# Patient Record
Sex: MALE | Race: WHITE | NOT HISPANIC OR LATINO | Employment: UNEMPLOYED | URBAN - METROPOLITAN AREA
[De-identification: names, ages, dates, MRNs, and addresses within clinical notes are randomized per-mention and may not be internally consistent; named-entity substitution may affect disease eponyms.]

---

## 2018-06-12 ENCOUNTER — OFFICE VISIT (OUTPATIENT)
Dept: RETAIL CLINIC | Facility: CLINIC | Age: 38
End: 2018-06-12

## 2018-06-12 DIAGNOSIS — Z02.1 DRUG SCREENING, PRE-EMPLOYMENT: Primary | ICD-10-CM

## 2021-03-16 ENCOUNTER — APPOINTMENT (OUTPATIENT)
Dept: GENERAL RADIOLOGY | Facility: HOSPITAL | Age: 41
End: 2021-03-16

## 2021-03-16 ENCOUNTER — APPOINTMENT (OUTPATIENT)
Dept: CT IMAGING | Facility: HOSPITAL | Age: 41
End: 2021-03-16

## 2021-03-16 ENCOUNTER — HOSPITAL ENCOUNTER (EMERGENCY)
Facility: HOSPITAL | Age: 41
Discharge: HOME | End: 2021-03-16
Attending: EMERGENCY MEDICINE | Admitting: EMERGENCY MEDICINE

## 2021-03-16 VITALS
HEART RATE: 87 BPM | OXYGEN SATURATION: 100 % | RESPIRATION RATE: 16 BRPM | HEIGHT: 68 IN | SYSTOLIC BLOOD PRESSURE: 117 MMHG | TEMPERATURE: 98.3 F | WEIGHT: 230 LBS | DIASTOLIC BLOOD PRESSURE: 79 MMHG | BODY MASS INDEX: 34.86 KG/M2

## 2021-03-16 DIAGNOSIS — F15.10 METHAMPHETAMINE USE (HCC): ICD-10-CM

## 2021-03-16 DIAGNOSIS — R07.89 OTHER CHEST PAIN: ICD-10-CM

## 2021-03-16 DIAGNOSIS — R59.9 LYMPH NODE ENLARGEMENT: ICD-10-CM

## 2021-03-16 DIAGNOSIS — K20.90 ESOPHAGITIS: Primary | ICD-10-CM

## 2021-03-16 LAB
ALBUMIN SERPL-MCNC: 4.4 G/DL (ref 3.5–5.2)
ALBUMIN/GLOB SERPL: 1.5 G/DL
ALP SERPL-CCNC: 104 U/L (ref 39–117)
ALT SERPL W P-5'-P-CCNC: 18 U/L (ref 1–41)
AMPHET+METHAMPHET UR QL: POSITIVE
AMPHETAMINES UR QL: POSITIVE
ANION GAP SERPL CALCULATED.3IONS-SCNC: 13.7 MMOL/L (ref 5–15)
AST SERPL-CCNC: 11 U/L (ref 1–40)
BARBITURATES UR QL SCN: NEGATIVE
BASOPHILS # BLD AUTO: 0.07 10*3/MM3 (ref 0–0.2)
BASOPHILS NFR BLD AUTO: 0.4 % (ref 0–1.5)
BENZODIAZ UR QL SCN: NEGATIVE
BILIRUB SERPL-MCNC: 0.6 MG/DL (ref 0–1.2)
BUN SERPL-MCNC: 20 MG/DL (ref 6–20)
BUN/CREAT SERPL: 16.1 (ref 7–25)
BUPRENORPHINE SERPL-MCNC: NEGATIVE NG/ML
CALCIUM SPEC-SCNC: 9.4 MG/DL (ref 8.6–10.5)
CANNABINOIDS SERPL QL: NEGATIVE
CHLORIDE SERPL-SCNC: 100 MMOL/L (ref 98–107)
CO2 SERPL-SCNC: 23.3 MMOL/L (ref 22–29)
COCAINE UR QL: NEGATIVE
CREAT SERPL-MCNC: 1.24 MG/DL (ref 0.76–1.27)
D DIMER PPP FEU-MCNC: 0.5 MCGFEU/ML (ref 0–0.46)
DEPRECATED RDW RBC AUTO: 44.9 FL (ref 37–54)
EOSINOPHIL # BLD AUTO: 0.3 10*3/MM3 (ref 0–0.4)
EOSINOPHIL NFR BLD AUTO: 1.7 % (ref 0.3–6.2)
ERYTHROCYTE [DISTWIDTH] IN BLOOD BY AUTOMATED COUNT: 13.4 % (ref 12.3–15.4)
GFR SERPL CREATININE-BSD FRML MDRD: 64 ML/MIN/1.73
GLOBULIN UR ELPH-MCNC: 2.9 GM/DL
GLUCOSE SERPL-MCNC: 214 MG/DL (ref 65–99)
HCT VFR BLD AUTO: 47.7 % (ref 37.5–51)
HGB BLD-MCNC: 15.7 G/DL (ref 13–17.7)
IMM GRANULOCYTES # BLD AUTO: 0.04 10*3/MM3 (ref 0–0.05)
IMM GRANULOCYTES NFR BLD AUTO: 0.2 % (ref 0–0.5)
LYMPHOCYTES # BLD AUTO: 2.17 10*3/MM3 (ref 0.7–3.1)
LYMPHOCYTES NFR BLD AUTO: 12 % (ref 19.6–45.3)
MCH RBC QN AUTO: 29.6 PG (ref 26.6–33)
MCHC RBC AUTO-ENTMCNC: 32.9 G/DL (ref 31.5–35.7)
MCV RBC AUTO: 89.8 FL (ref 79–97)
METHADONE UR QL SCN: NEGATIVE
MONOCYTES # BLD AUTO: 1.07 10*3/MM3 (ref 0.1–0.9)
MONOCYTES NFR BLD AUTO: 5.9 % (ref 5–12)
NEUTROPHILS NFR BLD AUTO: 14.46 10*3/MM3 (ref 1.7–7)
NEUTROPHILS NFR BLD AUTO: 79.8 % (ref 42.7–76)
OPIATES UR QL: NEGATIVE
OXYCODONE UR QL SCN: NEGATIVE
PCP UR QL SCN: NEGATIVE
PLATELET # BLD AUTO: 326 10*3/MM3 (ref 140–450)
PMV BLD AUTO: 10.3 FL (ref 6–12)
POTASSIUM SERPL-SCNC: 3.9 MMOL/L (ref 3.5–5.2)
PROPOXYPH UR QL: NEGATIVE
PROT SERPL-MCNC: 7.3 G/DL (ref 6–8.5)
RBC # BLD AUTO: 5.31 10*6/MM3 (ref 4.14–5.8)
SODIUM SERPL-SCNC: 137 MMOL/L (ref 136–145)
TRICYCLICS UR QL SCN: NEGATIVE
TROPONIN T SERPL-MCNC: <0.01 NG/ML (ref 0–0.03)
TROPONIN T SERPL-MCNC: <0.01 NG/ML (ref 0–0.03)
WBC # BLD AUTO: 18.11 10*3/MM3 (ref 3.4–10.8)

## 2021-03-16 PROCEDURE — 93010 ELECTROCARDIOGRAM REPORT: CPT | Performed by: INTERNAL MEDICINE

## 2021-03-16 PROCEDURE — 99284 EMERGENCY DEPT VISIT MOD MDM: CPT

## 2021-03-16 PROCEDURE — 99284 EMERGENCY DEPT VISIT MOD MDM: CPT | Performed by: EMERGENCY MEDICINE

## 2021-03-16 PROCEDURE — 71275 CT ANGIOGRAPHY CHEST: CPT

## 2021-03-16 PROCEDURE — 25010000002 KETOROLAC TROMETHAMINE PER 15 MG: Performed by: EMERGENCY MEDICINE

## 2021-03-16 PROCEDURE — 71045 X-RAY EXAM CHEST 1 VIEW: CPT

## 2021-03-16 PROCEDURE — 84484 ASSAY OF TROPONIN QUANT: CPT | Performed by: EMERGENCY MEDICINE

## 2021-03-16 PROCEDURE — 93005 ELECTROCARDIOGRAM TRACING: CPT | Performed by: EMERGENCY MEDICINE

## 2021-03-16 PROCEDURE — 96374 THER/PROPH/DIAG INJ IV PUSH: CPT

## 2021-03-16 PROCEDURE — 80306 DRUG TEST PRSMV INSTRMNT: CPT | Performed by: EMERGENCY MEDICINE

## 2021-03-16 PROCEDURE — 93005 ELECTROCARDIOGRAM TRACING: CPT

## 2021-03-16 PROCEDURE — 0 IOPAMIDOL PER 1 ML: Performed by: EMERGENCY MEDICINE

## 2021-03-16 PROCEDURE — 80053 COMPREHEN METABOLIC PANEL: CPT | Performed by: EMERGENCY MEDICINE

## 2021-03-16 PROCEDURE — 85025 COMPLETE CBC W/AUTO DIFF WBC: CPT | Performed by: EMERGENCY MEDICINE

## 2021-03-16 PROCEDURE — 85379 FIBRIN DEGRADATION QUANT: CPT | Performed by: EMERGENCY MEDICINE

## 2021-03-16 RX ORDER — KETOROLAC TROMETHAMINE 30 MG/ML
30 INJECTION, SOLUTION INTRAMUSCULAR; INTRAVENOUS EVERY 6 HOURS PRN
Status: DISCONTINUED | OUTPATIENT
Start: 2021-03-16 | End: 2021-03-16

## 2021-03-16 RX ORDER — FAMOTIDINE 20 MG/1
20 TABLET, FILM COATED ORAL 2 TIMES DAILY
Qty: 30 TABLET | Refills: 0 | Status: SHIPPED | OUTPATIENT
Start: 2021-03-16 | End: 2021-07-27 | Stop reason: ALTCHOICE

## 2021-03-16 RX ORDER — ASPIRIN 81 MG/1
324 TABLET, CHEWABLE ORAL ONCE
Status: COMPLETED | OUTPATIENT
Start: 2021-03-16 | End: 2021-03-16

## 2021-03-16 RX ORDER — SUCRALFATE ORAL 1 G/10ML
1 SUSPENSION ORAL 4 TIMES DAILY
Qty: 420 ML | Refills: 0 | Status: SHIPPED | OUTPATIENT
Start: 2021-03-16 | End: 2021-04-20

## 2021-03-16 RX ORDER — KETOROLAC TROMETHAMINE 30 MG/ML
30 INJECTION, SOLUTION INTRAMUSCULAR; INTRAVENOUS ONCE
Status: COMPLETED | OUTPATIENT
Start: 2021-03-16 | End: 2021-03-16

## 2021-03-16 RX ADMIN — IOPAMIDOL 127 ML: 755 INJECTION, SOLUTION INTRAVENOUS at 12:10

## 2021-03-16 RX ADMIN — ASPIRIN 81 MG CHEWABLE TABLET 324 MG: 81 TABLET CHEWABLE at 11:01

## 2021-03-16 RX ADMIN — KETOROLAC TROMETHAMINE 30 MG: 30 INJECTION, SOLUTION INTRAMUSCULAR; INTRAVENOUS at 11:01

## 2021-03-16 NOTE — ED PROVIDER NOTES
Subjective   History of Present Illness  History of Present Illness    Chief complaint: Chest pain    Location: Right upper portion of the chest, initially nipple to nipple, now localized in the right upper chest    Quality/Severity: 10 being the worst, 5 currently, sharp    Timing/Onset/Duration: Acute onset at 2 AM, intermittent, lasting about 30 minutes, resolving for about 45 minutes and then returning    Modifying Factors: No improvement with Aleve or ibuprofen.  The patient took 2 Aleve at 4:56 AM and about an hour prior to arrival approximately 9:30 AM took 2 Motrin.    Associated Symptoms: Patient had nausea but no vomiting he is short of breath.  He got diaphoretic with the pain.  He denies any fever chills or cough.  No abdominal pain.  The patient states that he had heartburn for about 2 hours last night.    Narrative: This 40-year-old white male presents with right upper chest pain.  The patient has no history of coronary artery disease.  He chews tobacco but does not smoke.  His mother had a balloon angioplasty.  The patient is not diabetic.  He has a history of hypertension but takes no medications.  He is unsure about his cholesterol or lipid status.  The patient has never had a blood clot in his leg or lung.  No recent long trips or surgeries.  No bleeding or clotting problems.  No cancer.  The patient is not on hormone therapy.    PCP: None      Review of Systems   Constitutional: Negative for chills and fever.   HENT: Negative for ear pain and sore throat.    Eyes: Negative for discharge and redness.   Respiratory: Negative for cough, chest tightness and shortness of breath.    Cardiovascular: Positive for chest pain. Negative for palpitations and leg swelling.   Gastrointestinal: Negative for abdominal pain, blood in stool, diarrhea, nausea and vomiting.   Genitourinary: Negative for difficulty urinating and dysuria.   Musculoskeletal: Negative for back pain, neck pain and neck stiffness.   Skin:  Negative for rash.   Neurological: Negative for dizziness, light-headedness and headaches.   Psychiatric/Behavioral: Negative.  Negative for confusion.        Medication List      You have not been prescribed any medications.         No past medical history on file.    No Known Allergies    Past Surgical History:   Procedure Laterality Date   • APPENDECTOMY         No family history on file.    Social History     Socioeconomic History   • Marital status: Single     Spouse name: Not on file   • Number of children: Not on file   • Years of education: Not on file   • Highest education level: Not on file   Tobacco Use   • Smoking status: Never Smoker   • Smokeless tobacco: Current User     Types: Chew   Vaping Use   • Vaping Use: Never used   Substance and Sexual Activity   • Alcohol use: Defer   • Drug use: Defer   • Sexual activity: Defer           Objective   Physical Exam  Vitals and nursing note reviewed.   Constitutional:       General: He is not in acute distress.     Appearance: He is well-developed. He is not diaphoretic.      Comments: ED Triage Vitals (03/16/21 0960)  Temp: 98.3 °F (36.8 °C)  Heart Rate: (!) 122  Resp: 20  BP: (!) 187/132  SpO2: 99 %  Temp src: Oral  Heart Rate Source: Monitor  Patient Position: Lying  BP Location: Right arm  FiO2 (%): n/a    The patient's vitals were reviewed by me.  Unless otherwise noted they are within normal limits.     HENT:      Head: Normocephalic and atraumatic.      Right Ear: External ear normal.      Left Ear: External ear normal.      Nose: Nose normal.   Eyes:      General:         Right eye: No discharge.         Left eye: No discharge.      Conjunctiva/sclera: Conjunctivae normal.      Pupils: Pupils are equal, round, and reactive to light.   Neck:      Thyroid: No thyromegaly.      Vascular: No JVD.      Trachea: No tracheal deviation.   Cardiovascular:      Rate and Rhythm: Normal rate and regular rhythm.      Heart sounds: Normal heart sounds. No murmur. No  friction rub. No gallop.    Pulmonary:      Effort: Pulmonary effort is normal. No respiratory distress.      Breath sounds: Normal breath sounds. No stridor. No wheezing or rales.   Chest:      Chest wall: No tenderness.   Abdominal:      General: Bowel sounds are normal. There is no distension.      Palpations: Abdomen is soft. There is no mass.      Tenderness: There is no abdominal tenderness. There is no guarding or rebound.      Hernia: No hernia is present.   Musculoskeletal:         General: No deformity. Normal range of motion.      Cervical back: Normal range of motion and neck supple.      Right lower leg: No tenderness. No edema.      Left lower leg: No tenderness. No edema.   Lymphadenopathy:      Cervical: No cervical adenopathy.   Skin:     General: Skin is warm and dry.      Coloration: Skin is not pale.      Findings: No erythema or rash.   Neurological:      General: No focal deficit present.      Mental Status: He is alert and oriented to person, place, and time.   Psychiatric:         Behavior: Behavior normal.         Procedures           ED Course  ED Course as of Mar 16 1313   Tue Mar 16, 2021   1131 The laboratory values reviewed by me.  The D-dimer is mildly elevated 0.5.  The white blood cell count is 18,000.  Relative neutrophil percent is 79%.  The serum glucose is 214 mg/dL.    [RC]   1306 The urine drug screen is positive for methamphetamine and amphetamine.    [RC]      ED Course User Index  [RC] Marquis Nicole MD      .10:06 EDT, 03/16/21:  The EKG shows a sinus tachycardia with a rate of 108.  There is a normal axis with no hypertrophy.  The TN, QRS, QT intervals are unremarkable.  There is no ectopy.  There is no acute ST elevation or depression.    11:32 EDT, 03/16/21:  The patient is feeling better.  His blood pressure is 115/81, his heart rate is 81, respirations 16, room air sats of 99%.  His chest pain is better.  With elevated D-dimer and shortness of breath I will obtain  a CT angiogram of the chest to evaluate for pulmonary embolus, I will get a urine drug screen.    12:53 EDT, 03/16/21:  The patient states that his chest pain has resolved with the Toradol.  He has no chest pain or shortness of breath.  His vital signs were reviewed and are stable.  We are awaiting his urine drug screen report.  We are awaiting his CT angiogram of the chest report.    12:54 EDT, 03/16/21:  Repeat EKG shows a normal sinus rhythm with rate of 76.  There is a normal axis with no hypertrophy.  The HI, QRS, QT intervals are unremarkable.  There is no ectopy.  There is no acute ST elevation or depression.  This EKG was compared to an EKG dated 3/16/2021 at 947 and is essentially unchanged.    13:03 EDT, 03/16/21:  The CT angiogram of the chest shows no pulmonary embolus.  There is a prominent lymph node in the region of the gastrohepatic ligament that will need further follow-up.  There is evidence of esophagitis.  The plan will be to have this patient follow-up with a gastroenterologist for further work-up and evaluation of the esophagitis and the lymph node in this region.    13:14 EDT, 03/16/21:  The patient's diagnosis of esophagitis, enlarged abdominal lymph node, and methamphetamine use with chest pain was discussed with the patient he did state that he does use methamphetamine.  He has been advised to stop using methamphetamine.  I will place him on Pepcid.  I will give him a prescription for Carafate.  The patient should follow-up with Dr. Qiu within 1 week.  The importance of following up with Dr. Qiu was discussed with the patient as he likely may need an EGD and evaluation of the swollen lymph node to further evaluate the patient and rule out malignancy.  All the patient's questions were answered he will be discharged in good condition.  The patient should avoid spicy and fatty foods.  He should avoid alcohol.  The patient should return if there is worsening pain, shortness of  breath, vomiting blood, black, bloody, or tarry stools, worse in any way at all.                                         MDM    Final diagnoses:   Esophagitis   Lymph node enlargement   Methamphetamine use (CMS/HCC)   Other chest pain       ED Disposition  ED Disposition     None          No follow-up provider specified.       Medication List      No changes were made to your prescriptions during this visit.          Marquis Nicole MD  03/16/21 6220

## 2021-03-16 NOTE — DISCHARGE INSTRUCTIONS
Avoid spicy and fatty foods.  Avoid alcohol use.  Stop using methamphetamine.  Follow-up with Dr. Qiu within 1 week.  Return to the emergency department if there is worsening pain, black, bloody, or tarry stools, vomiting blood, or worse in any way at all.

## 2021-03-17 LAB
QT INTERVAL: 325 MS
QT INTERVAL: 352 MS

## 2021-03-23 ENCOUNTER — OFFICE VISIT (OUTPATIENT)
Dept: ORTHOPEDIC SURGERY | Facility: CLINIC | Age: 41
End: 2021-03-23

## 2021-03-23 VITALS
WEIGHT: 230 LBS | BODY MASS INDEX: 34.86 KG/M2 | HEIGHT: 68 IN | SYSTOLIC BLOOD PRESSURE: 113 MMHG | DIASTOLIC BLOOD PRESSURE: 72 MMHG | HEART RATE: 93 BPM

## 2021-03-23 DIAGNOSIS — S59.901A ELBOW INJURY, RIGHT, INITIAL ENCOUNTER: Primary | ICD-10-CM

## 2021-03-23 PROCEDURE — 73080 X-RAY EXAM OF ELBOW: CPT | Performed by: NURSE PRACTITIONER

## 2021-03-23 PROCEDURE — 99213 OFFICE O/P EST LOW 20 MIN: CPT | Performed by: NURSE PRACTITIONER

## 2021-03-23 RX ORDER — LORATADINE 10 MG/1
10 TABLET ORAL
COMMUNITY
Start: 2021-02-08

## 2021-03-23 RX ORDER — MELOXICAM 15 MG/1
15 TABLET ORAL DAILY
Qty: 30 TABLET | Refills: 0 | Status: SHIPPED | OUTPATIENT
Start: 2021-03-23 | End: 2021-04-20

## 2021-03-23 NOTE — PROGRESS NOTES
Subjective:     Patient ID: MANUELITO Whitehead is a 40 y.o. male.    Chief Complaint:  Right elbow injury   History of Present Illness  MANUELITO Whitehead 40 y.o male who presents with a 2-3 week history of pain right elbow. He was lifting a heavy item onto a vehicle at work on 3/4/2021 or 3/5/2021 when he heard and felt a pop at the cubital fossa right elbow followed by onset of pain. Presented to urgent care, provided with sling and encouraged to follow-up in our office. Pain present at elbow, cubital fossa with pain radiating to biceps muscle. He is right-hand dominate, experiencing decreased strength forearm and hand. Denies significant swelling, bruising but increased pain and increased weakness with lifting, pulling, pushing. Pain relieved with flexion and holding arm close to body, increased pain with extension, supination and pronation activities. Rates discomfort 3-4 out of 10 describes pain at throbbing, stabbing, shooting in nature. Denies presence of numbness/tingling right upper extremity. Denies previous x-ray, MRI, CT right upper extremity. Denies all other concerns present at this time.      Social History     Occupational History   • Not on file   Tobacco Use   • Smoking status: Never Smoker   • Smokeless tobacco: Current User     Types: Chew   Vaping Use   • Vaping Use: Never used   Substance and Sexual Activity   • Alcohol use: Defer   • Drug use: Defer   • Sexual activity: Defer      History reviewed. No pertinent past medical history.  Past Surgical History:   Procedure Laterality Date   • APPENDECTOMY         History reviewed. No pertinent family history.      Review of Systems   Constitutional: Negative for appetite change, chills, diaphoresis, fever and unexpected weight change.   HENT: Negative for hearing loss, nosebleeds, sore throat and tinnitus.    Eyes: Negative for pain and visual disturbance.   Respiratory: Negative for cough, shortness of breath and wheezing.    Cardiovascular: Negative for  "chest pain and palpitations.   Gastrointestinal: Negative for abdominal pain, diarrhea, nausea and vomiting.   Endocrine: Negative for cold intolerance, heat intolerance and polydipsia.   Genitourinary: Negative for difficulty urinating, dysuria and hematuria.   Musculoskeletal: Positive for arthralgias and myalgias. Negative for joint swelling.   Skin: Negative for rash and wound.   Allergic/Immunologic: Negative for environmental allergies.   Neurological: Negative for dizziness, syncope and numbness.   Hematological: Does not bruise/bleed easily.   Psychiatric/Behavioral: Negative for dysphoric mood and sleep disturbance. The patient is not nervous/anxious.            Objective:  Physical Exam    Vital signs reviewed.   General: No acute distress.  Eyes: conjunctiva clear; pupils equally round and reactive  ENT: external ears and nose atraumatic; oropharynx clear  CV: no peripheral edema  Resp: normal respiratory effort  Skin: no rashes or wounds; normal turgor  Psych: mood and affect appropriate; recent and remote memory intact    Vitals:    03/23/21 1447   BP: 113/72   BP Location: Left arm   Pulse: 93   Weight: 104 kg (230 lb)   Height: 172.7 cm (68\")         03/23/21  1447   Weight: 104 kg (230 lb)     Body mass index is 34.97 kg/m².      Right Elbow Exam     Range of Motion   Extension: 0   Flexion: 120   Pronation: 0   Supination: 120     Muscle Strength   Pronation:  4/5   Supination:  4/5     Tests   Varus: negative  Valgus: negative  Tinel's sign (cubital tunnel): negative    Other   Erythema: absent  Sensation: normal  Pulse: present    Comments:  Positive hook test                 Imaging:  Right Elbow X-Ray  Indication: Pain  Views: AP and Lateral views    Findings:  No fracture  No bony lesion  Normal soft tissues  Normal joint spaces    No prior studies were available for comparison.    Assessment:        1. Elbow injury, right, initial encounter           Plan:  1. Discussed plan of care with " patient. Will proceed with MRI right elbow to evaluate distal biceps tendon tear. Will start meloxicam, plan to see him back in clinic after completion of testing to discuss results and further plan of care. Discussed no lifting, pulling, pushing greater than 5 lbs.   Orders:  Orders Placed This Encounter   Procedures   • XR Elbow 3+ View Right   • MRI elbow right wo contrast       Medications:  New Medications Ordered This Visit   Medications   • meloxicam (MOBIC) 15 MG tablet     Sig: Take 1 tablet by mouth Daily.     Dispense:  30 tablet     Refill:  0       Followup:  No follow-ups on file.    Diagnoses and all orders for this visit:    1. Elbow injury, right, initial encounter (Primary)  -     XR Elbow 3+ View Right  -     MRI elbow right wo contrast; Future    Other orders  -     meloxicam (MOBIC) 15 MG tablet; Take 1 tablet by mouth Daily.  Dispense: 30 tablet; Refill: 0          I ordered and reviewed the VALERY today.     Dictated utilizing Dragon dictation

## 2021-04-02 ENCOUNTER — HOSPITAL ENCOUNTER (OUTPATIENT)
Dept: MRI IMAGING | Facility: HOSPITAL | Age: 41
Discharge: HOME | End: 2021-04-02
Admitting: NURSE PRACTITIONER

## 2021-04-02 DIAGNOSIS — S59.901A ELBOW INJURY, RIGHT, INITIAL ENCOUNTER: ICD-10-CM

## 2021-04-02 PROCEDURE — 73221 MRI JOINT UPR EXTREM W/O DYE: CPT

## 2021-04-05 NOTE — PROGRESS NOTES
Junie,   When you have a chance, can you call Mr. Whitehead and let him know his MRI results show tendinitis but no tear requiring surgery. He will benefit from occupational therapy if he would like to proceed. Thanks

## 2021-04-09 DIAGNOSIS — M75.21 BICEPS TENDINITIS OF RIGHT UPPER EXTREMITY: Primary | ICD-10-CM

## 2021-04-09 DIAGNOSIS — S59.901A ELBOW INJURY, RIGHT, INITIAL ENCOUNTER: ICD-10-CM

## 2021-04-20 ENCOUNTER — OFFICE VISIT (OUTPATIENT)
Dept: ORTHOPEDIC SURGERY | Facility: CLINIC | Age: 41
End: 2021-04-20

## 2021-04-20 VITALS — BODY MASS INDEX: 34.86 KG/M2 | HEIGHT: 68 IN | WEIGHT: 230 LBS

## 2021-04-20 DIAGNOSIS — R20.2 PARESTHESIA AND PAIN OF BOTH UPPER EXTREMITIES: ICD-10-CM

## 2021-04-20 DIAGNOSIS — M79.642 BILATERAL HAND PAIN: ICD-10-CM

## 2021-04-20 DIAGNOSIS — M75.21 BICEPS TENDINITIS OF RIGHT UPPER EXTREMITY: Primary | ICD-10-CM

## 2021-04-20 DIAGNOSIS — M79.641 BILATERAL HAND PAIN: ICD-10-CM

## 2021-04-20 DIAGNOSIS — M79.601 PARESTHESIA AND PAIN OF BOTH UPPER EXTREMITIES: ICD-10-CM

## 2021-04-20 DIAGNOSIS — M79.602 PARESTHESIA AND PAIN OF BOTH UPPER EXTREMITIES: ICD-10-CM

## 2021-04-20 PROCEDURE — 99214 OFFICE O/P EST MOD 30 MIN: CPT | Performed by: NURSE PRACTITIONER

## 2021-04-20 RX ORDER — MELOXICAM 15 MG/1
15 TABLET ORAL DAILY
Qty: 30 TABLET | Refills: 3 | Status: SHIPPED | OUTPATIENT
Start: 2021-04-20 | End: 2021-07-27 | Stop reason: ALTCHOICE

## 2021-04-20 NOTE — PROGRESS NOTES
Subjective:     Patient ID: MANUELITO Whitehead is a 40 y.o. male.    Chief Complaint:  Follow-up biceps tendinitis right  Paresthesia bilateral hands, new issue to examiner  History of Present Illness  MANUELITO Whitehead presents to clinic for follow-up of his right upper extremity.  Has completed the MRI discussed results with patient was referred to occupational therapy.  He states that he thought he was coming in for occupational therapy today is confused about the follow-up appointment.  He does say someone had called him and he said he would like to proceed and he was instructed that he would receive a call back to schedule appointment.  He also like to be seen for pain he is experiencing bilateral hands and palmar aspects with lifting, pulling, pushing and he is recently noted onset of paresthesia at digits on the right hand 234 and 5 is waking up at night having to shake the hands out to regain sensation and also at the left hand although not to the severity of that of the right digits 2 3-4.  Denies previously experiencing similar symptoms in the past.  Denies other concerns present time.  Denies any recent bracing for the wrist has tried some copper fit hand braces at night which caused a burning sensation therefore is discontinued use.  Denies other concerns at this time.     Social History     Occupational History   • Not on file   Tobacco Use   • Smoking status: Never Smoker   • Smokeless tobacco: Current User     Types: Chew   Vaping Use   • Vaping Use: Never used   Substance and Sexual Activity   • Alcohol use: Defer   • Drug use: Defer   • Sexual activity: Defer      History reviewed. No pertinent past medical history.  Past Surgical History:   Procedure Laterality Date   • APPENDECTOMY         History reviewed. No pertinent family history.      Review of Systems   Constitutional: Negative for chills, diaphoresis, fever and unexpected weight change.   HENT: Negative for hearing loss, nosebleeds, sore throat and  "tinnitus.    Eyes: Negative for pain and visual disturbance.   Respiratory: Negative for cough, shortness of breath and wheezing.    Cardiovascular: Negative for chest pain and palpitations.   Gastrointestinal: Negative for abdominal pain, diarrhea, nausea and vomiting.   Endocrine: Negative for cold intolerance, heat intolerance and polydipsia.   Genitourinary: Negative for difficulty urinating, dysuria and hematuria.   Musculoskeletal: Positive for arthralgias and myalgias. Negative for joint swelling.   Skin: Negative for rash and wound.   Allergic/Immunologic: Negative for environmental allergies.   Neurological: Negative for dizziness, syncope and numbness.   Hematological: Does not bruise/bleed easily.   Psychiatric/Behavioral: Negative for dysphoric mood and sleep disturbance. The patient is not nervous/anxious.            Objective:  Physical Exam    General: No acute distress.  Eyes: conjunctiva clear; pupils equally round and reactive  ENT: external ears and nose atraumatic; oropharynx clear  CV: no peripheral edema  Resp: normal respiratory effort  Skin: no rashes or wounds; normal turgor  Psych: mood and affect appropriate; recent and remote memory intact    Vitals:    04/20/21 0933   Weight: 104 kg (230 lb)   Height: 172.7 cm (68\")         04/20/21  0933   Weight: 104 kg (230 lb)     Body mass index is 34.97 kg/m².      Ortho Exam     Right Elbow Exam   Maximal tenderness distal biceps tendon  Range of Motion   Extension: 0   Flexion: 120   Pronation: 0   Supination: 120      Muscle Strength   Pronation:  4/5   Supination:  4/5      Tests   Varus: negative  Valgus: negative  Tinel's sign (cubital tunnel): negative     Other   Erythema: absent  Sensation: normal  Pulse: present    Bilateral hands examined  Positive sensation the palmar and dorsum of the hand  Positive Osvaldo's test right upper extremity digits 2,3 4, distal aspect; left upper extremity digits 2, 3, 4  2+ distal radial pulse bilateral " upper extremities  Flex/extend all digits bilateral hands  Brisk cap refill all digits bilateral hands  Negative Tinel's at cubital tunnel at the left upper extremity   strength 5 out of 5 bilateral upper extremities     Assessment:        1. Biceps tendinitis of right upper extremity    2. Bilateral hand pain    3. Paresthesia and pain of both upper extremities           Plan:  1.  Discussed plan of care with patient.  We will proceed with again with occupational therapy for strengthening the right upper extremity.  We will reorder the meloxicam that he has not yet started discussed for reduction of swelling and pain control.  Discussed application of wrist immobilizers that he can wear at night bilateral wrists plan to see him back in clinic in 6 weeks to reevaluate for bilateral wrist pain in the distal biceps tendinitis.  All questions answered.  Orders:  No orders of the defined types were placed in this encounter.      Medications:  New Medications Ordered This Visit   Medications   • meloxicam (MOBIC) 15 MG tablet     Sig: Take 1 tablet by mouth Daily.     Dispense:  30 tablet     Refill:  3       Followup:  No follow-ups on file.    Diagnoses and all orders for this visit:    1. Biceps tendinitis of right upper extremity (Primary)    2. Bilateral hand pain    3. Paresthesia and pain of both upper extremities    Other orders  -     meloxicam (MOBIC) 15 MG tablet; Take 1 tablet by mouth Daily.  Dispense: 30 tablet; Refill: 3          I ordered and reviewed the VALERY today.     Dictated utilizing Dragon dictation

## 2021-07-27 ENCOUNTER — OFFICE VISIT (OUTPATIENT)
Dept: ORTHOPEDIC SURGERY | Facility: CLINIC | Age: 41
End: 2021-07-27

## 2021-07-27 VITALS — HEIGHT: 68 IN | WEIGHT: 230 LBS | BODY MASS INDEX: 34.86 KG/M2

## 2021-07-27 DIAGNOSIS — M79.641 BILATERAL HAND PAIN: Primary | ICD-10-CM

## 2021-07-27 DIAGNOSIS — M75.21 BICEPS TENDINITIS OF RIGHT UPPER EXTREMITY: ICD-10-CM

## 2021-07-27 DIAGNOSIS — M79.642 BILATERAL HAND PAIN: Primary | ICD-10-CM

## 2021-07-27 PROCEDURE — 99214 OFFICE O/P EST MOD 30 MIN: CPT | Performed by: NURSE PRACTITIONER

## 2021-07-27 PROCEDURE — 73130 X-RAY EXAM OF HAND: CPT | Performed by: NURSE PRACTITIONER

## 2021-07-27 RX ORDER — PREDNISONE 10 MG/1
TABLET ORAL
Qty: 21 TABLET | Refills: 0 | Status: SHIPPED | OUTPATIENT
Start: 2021-07-27 | End: 2021-08-24

## 2021-07-27 RX ORDER — MELOXICAM 15 MG/1
15 TABLET ORAL DAILY
Qty: 30 TABLET | Refills: 0 | Status: SHIPPED | OUTPATIENT
Start: 2021-07-27 | End: 2021-08-24 | Stop reason: SDUPTHER

## 2021-07-27 NOTE — PROGRESS NOTES
Subjective:     Patient ID: Nicholas Whitehead is a 41 y.o. male.    Chief Complaint:  Follow-up distal biceps tendinitis, right upper extremity  Bilateral hand pain  History of Present Illness  Nicholas Whitehead returns to clinic for follow-up of bilateral hands.  Has tried wrist immobilizer at night without significant symptom relief and reports of the pain is increasing with the wrist immobilizers therefore has discontinued use.  He did not follow-up with occupational therapy for the right upper extremity distal biceps tendinitis.  Reports that his company he was previously working for him to let him go since he was last seen.  He continues to experience pain bilateral hands worse with flexion extension of the digits pointing to the PIP joints and MP joints bilateral hands associated with popping and cracking bilateral hands.  He does note upper extremity with numbness and tingling does have to shake the hands in the morning to regain sensation.  Increased pain noted with gripping and other fine motor motions.  Denies other concerns present time.     Social History     Occupational History   • Not on file   Tobacco Use   • Smoking status: Never Smoker   • Smokeless tobacco: Current User     Types: Chew   Vaping Use   • Vaping Use: Never used   Substance and Sexual Activity   • Alcohol use: Defer   • Drug use: Defer   • Sexual activity: Defer      History reviewed. No pertinent past medical history.  Past Surgical History:   Procedure Laterality Date   • APPENDECTOMY         History reviewed. No pertinent family history.      Objective:  Physical Exam  General: No acute distress.  Eyes: conjunctiva clear; pupils equally round and reactive  ENT: external ears and nose atraumatic; oropharynx clear  CV: no peripheral edema  Resp: normal respiratory effort  Skin: no rashes or wounds; normal turgor  Psych: mood and affect appropriate; recent and remote memory intact    Vitals:    07/27/21 0959   Weight: 104 kg (230 lb)   Height:  "172.7 cm (68\")         21  0959   Weight: 104 kg (230 lb)     Body mass index is 34.97 kg/m².      Right Elbow Exam     Range of Motion   Extension: 0   Flexion: 120   Pronation: 0     Muscle Strength   Pronation:  5/5   Supination:  5/5     Tests   Varus: negative  Valgus: negative  Tinel's sign (cubital tunnel): negative    Other   Erythema: absent  Sensation: normal  Pulse: present    Comments:  Positive tenderness distal biceps tendon insertion             Bilateral hands exam:  Positive sensation the palmar and dorsum of the hand  Demonstrates grinding popping sensation PIP joints digits 2, 3, 4 and 5  2+ distal radial pulse bilateral upper extremities  Flex/extend all digits bilateral hands  Brisk cap refill all digits bilateral hands  Negative Tinel's at cubital tunnel at the left upper extremity   strength 5 out of 5 bilateral upper extremities    Imaging:  Bilateral Hand X-Ray  Indication: Pain  AP, Lateral, and Oblique views    Findings:  No fracture  No bony lesion  Normal soft tissues  Normal joint spaces  No evidence of joint destruction  No prior studies were available for comparison.    Assessment:        1. Bilateral hand pain           Plan:  1.  Discussed plan of care with patient.  Do recommend referral occupational therapy for the distal biceps tendinitis of right upper extremity.  2.  Will start prednisone taper for the swelling and pain is experiencing bilateral hands as well followed by meloxicam.  Plan to see him back in clinic in 4 weeks to reevaluate.  He verbalized understanding of information agrees with plan of care.  Denies other concerns present time.  Orders:  Orders Placed This Encounter   Procedures   • XR Hand 3+ View Bilateral       Medications:  New Medications Ordered This Visit   Medications   • predniSONE (DELTASONE) 10 MG tablet     Si tabs day 1, 5 tabs day 2, 4 tabs day 3, 3 tabs day 4, 2 tabs day 5, 1 tab day 6     Dispense:  21 tablet     Refill:  0   • " meloxicam (MOBIC) 15 MG tablet     Sig: Take 1 tablet by mouth Daily.     Dispense:  30 tablet     Refill:  0       Followup:  No follow-ups on file.    Diagnoses and all orders for this visit:    1. Bilateral hand pain (Primary)  -     XR Hand 3+ View Bilateral    Other orders  -     predniSONE (DELTASONE) 10 MG tablet; 6 tabs day 1, 5 tabs day 2, 4 tabs day 3, 3 tabs day 4, 2 tabs day 5, 1 tab day 6  Dispense: 21 tablet; Refill: 0  -     meloxicam (MOBIC) 15 MG tablet; Take 1 tablet by mouth Daily.  Dispense: 30 tablet; Refill: 0        Dictated utilizing Dragon dictation

## 2021-08-24 ENCOUNTER — OFFICE VISIT (OUTPATIENT)
Dept: ORTHOPEDIC SURGERY | Facility: CLINIC | Age: 41
End: 2021-08-24

## 2021-08-24 VITALS
HEART RATE: 116 BPM | BODY MASS INDEX: 34.86 KG/M2 | SYSTOLIC BLOOD PRESSURE: 140 MMHG | WEIGHT: 230 LBS | DIASTOLIC BLOOD PRESSURE: 98 MMHG | HEIGHT: 68 IN

## 2021-08-24 DIAGNOSIS — M79.641 BILATERAL HAND PAIN: ICD-10-CM

## 2021-08-24 DIAGNOSIS — R42 DIZZINESS: Primary | ICD-10-CM

## 2021-08-24 DIAGNOSIS — M79.642 BILATERAL HAND PAIN: ICD-10-CM

## 2021-08-24 PROCEDURE — 99214 OFFICE O/P EST MOD 30 MIN: CPT | Performed by: NURSE PRACTITIONER

## 2021-08-24 RX ORDER — MELOXICAM 15 MG/1
15 TABLET ORAL DAILY
Qty: 30 TABLET | Refills: 0 | Status: SHIPPED | OUTPATIENT
Start: 2021-08-24 | End: 2022-05-13 | Stop reason: SDUPTHER

## 2021-08-24 NOTE — PROGRESS NOTES
"Subjective:     Patient ID: Nicholas Whitehead is a 41 y.o. male.    Chief Complaint:  Bilateral hand pain   History of Present Illness  Nicholas Whitehead presents to clinic for follow-up of bilateral hand pain.  Was started on prednisone taper followed by meloxicam is continued with the meloxicam for the last 4 weeks tolerating well.  He does report slight dizziness over the last 4 days denies any his primary care provider would like to establish care.  Rates pain at 5 out of a 10 her pain was a 10 out of 10 before starting treatment very pleased with symptom relief continues to experience a crepitus sensation and audible with flexion extension the PIP joint bilateral hands greater than right as he is right-hand dominant.  Denies other concerns present this time.     Social History     Occupational History   • Not on file   Tobacco Use   • Smoking status: Never Smoker   • Smokeless tobacco: Current User     Types: Chew   Vaping Use   • Vaping Use: Never used   Substance and Sexual Activity   • Alcohol use: Defer   • Drug use: Defer   • Sexual activity: Defer      History reviewed. No pertinent past medical history.  Past Surgical History:   Procedure Laterality Date   • APPENDECTOMY         History reviewed. No pertinent family history.      Objective:  Physical Exam    General: No acute distress.  Eyes: conjunctiva clear; pupils equally round and reactive  ENT: external ears and nose atraumatic; oropharynx clear  CV: no peripheral edema  Resp: normal respiratory effort  Skin: no rashes or wounds; normal turgor  Psych: mood and affect appropriate; recent and remote memory intact    Vitals:    08/24/21 1023   BP: 140/98   Pulse: 116   Weight: 104 kg (230 lb)   Height: 172.7 cm (68\")         08/24/21  1023   Weight: 104 kg (230 lb)     Body mass index is 34.97 kg/m².      Right Hand Exam     Range of Motion   Hand   PIP Middle: normal     Muscle Strength   Wrist extension: 4/5   Wrist flexion: 4/5   : 4/5     Other "   Erythema: absent  Sensation: normal  Pulse: present    Comments:  Audible crepitus with flexion/extension digits 2-5 PIP joint   Positive sensation light touch dorsum and palmar aspect of hand and all digits            Assessment:        1. Dizziness    2. Bilateral hand pain           Plan:  1.  Discussed to continue with meloxicam.  He is reporting dizziness would like to establish care with primary care divider we will plan to have him referred to establish care.  Does report history of ear infections which may be related to dizziness sensation.  Denies other concerns present time.  Orders:  Orders Placed This Encounter   Procedures   • Ambulatory Referral to Family Practice       Medications:  New Medications Ordered This Visit   Medications   • meloxicam (MOBIC) 15 MG tablet     Sig: Take 1 tablet by mouth Daily.     Dispense:  30 tablet     Refill:  0       Followup:  No follow-ups on file.    Diagnoses and all orders for this visit:    1. Dizziness (Primary)  -     Ambulatory Referral to Family Practice    2. Bilateral hand pain    Other orders  -     meloxicam (MOBIC) 15 MG tablet; Take 1 tablet by mouth Daily.  Dispense: 30 tablet; Refill: 0          I ordered and reviewed the VALERY today.     Dictated utilizing Dragon dictation

## 2022-04-15 ENCOUNTER — OFFICE VISIT (OUTPATIENT)
Dept: ORTHOPEDIC SURGERY | Facility: CLINIC | Age: 42
End: 2022-04-15

## 2022-04-15 VITALS — WEIGHT: 230 LBS | HEIGHT: 68 IN | BODY MASS INDEX: 34.86 KG/M2

## 2022-04-15 DIAGNOSIS — M65.331 TRIGGER MIDDLE FINGER OF RIGHT HAND: Primary | ICD-10-CM

## 2022-04-15 PROCEDURE — 99214 OFFICE O/P EST MOD 30 MIN: CPT | Performed by: NURSE PRACTITIONER

## 2022-04-15 PROCEDURE — 20600 DRAIN/INJ JOINT/BURSA W/O US: CPT | Performed by: NURSE PRACTITIONER

## 2022-04-15 RX ADMIN — LIDOCAINE HYDROCHLORIDE 0.5 ML: 10 INJECTION, SOLUTION EPIDURAL; INFILTRATION; INTRACAUDAL; PERINEURAL at 09:37

## 2022-04-15 RX ADMIN — TRIAMCINOLONE ACETONIDE 20 MG: 40 INJECTION, SUSPENSION INTRA-ARTICULAR; INTRAMUSCULAR at 09:37

## 2022-04-15 NOTE — PROGRESS NOTES
Subjective:     Patient ID: Nicholas Whitehead is a 41 y.o. male.    Chief Complaint:  Right hand pain, new issue to examiner  History of Present Illness  Nicholas Whitehead 41-year-old male presents to clinic with new onset of pain to the palmar aspect long finger right hand as well as the left.  Denies any recent injury.  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  He does have to manually unlock the third digit in the morning and occasionally throughout the day.  Denies any recent x-rays, MRI, CT.  Denies any recent corticosteroid injections.  Is continued taking meloxicam for pain he is experiencing in bilateral hands without any significant symptom relief.  Denies knowledge of any thyroid disorders denies any knowledge of diabetes.  Denies any presence of numbness or tingling bilateral upper extremities.  Denies other concerns present this time.         Social History     Occupational History   • Not on file   Tobacco Use   • Smoking status: Never Smoker   • Smokeless tobacco: Current User     Types: Chew   Vaping Use   • Vaping Use: Never used   Substance and Sexual Activity   • Alcohol use: Not Currently     Alcohol/week: 0.0 standard drinks   • Drug use: Never   • Sexual activity: Yes     Partners: Female, Male     Birth control/protection: None      History reviewed. No pertinent past medical history.  Past Surgical History:   Procedure Laterality Date   • APPENDECTOMY         Family History   Problem Relation Age of Onset   • Diabetes Mother          Review of Systems        Objective:  Physical Exam    Vital signs reviewed.   General: No acute distress.  Eyes: conjunctiva clear; pupils equally round and reactive  ENT: external ears and nose atraumatic; oropharynx clear  CV: no peripheral edema  Resp: normal respiratory effort  Skin: no rashes or wounds; normal  "turgor  Psych: mood and affect appropriate; recent and remote memory intact    Vitals:    04/15/22 0908   Weight: 104 kg (230 lb)   Height: 172.7 cm (68\")         04/15/22  0908   Weight: 104 kg (230 lb)     Body mass index is 34.97 kg/m².      Ortho Exam     Right hand examined  Flex/extend all digits right hand with catching and locking third digit.  Positive tenderness palmar aspect of the hand around the A1 pulley with palpable knot  Positive sensation the dorsum and palmar aspect of the hand and all digits  Wrist cap refill all digits right hand  2+ distal radial pulse  Positive tenderness along the palmar aspect of the left hand at A1 pulley is not locking or catching of the left upper extremity upper extremity    Assessment:        1. Trigger middle finger of right hand           Plan:    Small Joint Arthrocentesis  Consent given by: patient  Site marked: site marked  Timeout: Immediately prior to procedure a time out was called to verify the correct patient, procedure, equipment, support staff and site/side marked as required   Supporting Documentation  Indications: pain   Procedure Details  Location: long finger - Long finger joint: palmar.  Preparation: Patient was prepped and draped in the usual sterile fashion  Needle size: 22 G  Approach: bazzi.  Medications administered: 20 mg triamcinolone acetonide 40 MG/ML; 0.5 mL lidocaine PF 1% 1 %  Patient tolerance: patient tolerated the procedure well with no immediate complications          1.  Discussed plan of care with patient.  Discussed treatment options including surgical procedure including trigger finger release versus corticosteroid injection.  At this time does not wish to proceed with surgical intervention we will hold off.  Wishes to proceed with corticosteroid injection palmar aspect long finger A1 pulley.  Plan to see him back in clinic 4 weeks to reevaluate.  Discussed application of ice at injection site continue with active range of motion of " the hand.  He verbalized understanding of information agrees with plan of care.  Denies other concerns present this time.  Orders:  Orders Placed This Encounter   Procedures   • Small Joint Arthrocentesis: R long MCP     No orders of the defined types were placed in this encounter.          I ordered and reviewed the VALERY today.

## 2022-04-16 NOTE — PROGRESS NOTES
41-year-old male presents to clinic with new onset of pain to the palmar aspect long finger right hand as well as the left. Denies any recent injury. 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 He does have to manually unlock the third digit in the morning and occasionally throughout the day. Denies any recent x-rays, MRI, CT. Denies any recent corticosteroid injections. Is continued taking meloxicam for pain he is experiencing in bilateral hands without any significant symptom relief. Denies knowledge of any thyroid disorders denies any knowledge of diabetes. Denies any presence of numbness or tingling bilateral upper extremities. Denies other concerns present this time.     Exam:  Right hand examined  Flex/extend all digits right hand with catching and locking third digit. Positive tenderness palmar aspect of the hand around the A1 pulley with palpable knot  Positive sensation the dorsum and palmar aspect of the hand and all digits  Wrist cap refill all digits right hand  2+ distal radial pulse  Positive tenderness along the palmar aspect of the left hand at A1 pulley is not locking or catching of the left upper extremity upper extremity    Plan:  1. Discussed plan of care with patient. Discussed treatment options including surgical procedure including trigger finger release versus corticosteroid injection. At this time does not wish to proceed with surgical intervention we will hold off. Wishes to proceed with corticosteroid injection palmar aspect long finger A1 pulley. Plan to see him back in clinic 4 weeks to reevaluate. Discussed application of ice at injection site continue with active range of motion of the hand. He verbalized understanding of information agrees with plan of care. Denies other concerns present this time.

## 2022-04-17 PROBLEM — M65.331 TRIGGER MIDDLE FINGER OF RIGHT HAND: Status: ACTIVE | Noted: 2022-04-17

## 2022-04-17 RX ORDER — TRIAMCINOLONE ACETONIDE 40 MG/ML
20 INJECTION, SUSPENSION INTRA-ARTICULAR; INTRAMUSCULAR
Status: COMPLETED | OUTPATIENT
Start: 2022-04-15 | End: 2022-04-15

## 2022-04-17 RX ORDER — LIDOCAINE HYDROCHLORIDE 10 MG/ML
0.5 INJECTION, SOLUTION EPIDURAL; INFILTRATION; INTRACAUDAL; PERINEURAL
Status: COMPLETED | OUTPATIENT
Start: 2022-04-15 | End: 2022-04-15

## 2022-04-21 ENCOUNTER — TELEPHONE (OUTPATIENT)
Dept: ORTHOPEDIC SURGERY | Facility: CLINIC | Age: 42
End: 2022-04-21

## 2022-04-21 NOTE — TELEPHONE ENCOUNTER
Patient called about his right middle finger. Had injection 04.15.22  Still painful, he has a follow up appointment 05.13.22. He would like to the next steps  ? RX [facility]

## 2022-05-13 ENCOUNTER — OFFICE VISIT (OUTPATIENT)
Dept: ORTHOPEDIC SURGERY | Facility: CLINIC | Age: 42
End: 2022-05-13

## 2022-05-13 DIAGNOSIS — M65.331 TRIGGER MIDDLE FINGER OF RIGHT HAND: Primary | ICD-10-CM

## 2022-05-13 PROCEDURE — 99214 OFFICE O/P EST MOD 30 MIN: CPT | Performed by: NURSE PRACTITIONER

## 2022-05-13 RX ORDER — MELOXICAM 15 MG/1
15 TABLET ORAL DAILY
Qty: 30 TABLET | Refills: 0 | Status: SHIPPED | OUTPATIENT
Start: 2022-05-13 | End: 2022-06-09

## 2022-05-13 RX ORDER — DOXYCYCLINE HYCLATE 100 MG/1
100 CAPSULE ORAL 2 TIMES DAILY
Qty: 20 CAPSULE | Refills: 0 | Status: SHIPPED | OUTPATIENT
Start: 2022-05-13 | End: 2022-06-08

## 2022-05-13 NOTE — PROGRESS NOTES
Subjective:     Patient ID: Nicholas Whitehead is a 41 y.o. male.    Chief Complaint:  Follow-up trigger finger   History of Present Illness  Nicholas Whitehead returns clinic for follow-up of his third digit right hand.  Received a steroid injection last visit long finger right hand has noted some erythema swelling over the last few weeks.  He did call to clinic as noted he was still experiencing pain 6 days later, no mention of any erythema.  He presents today with reported erythema along the palmar aspect of the third digit.  He is noticing throbbing sensation the prior catching is gone but is experiencing pain with range of motion.  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  He has applied ice to try heat on 1 occasion however made symptoms worse.  Denies other concerns present time.    Social History     Occupational History   • Not on file   Tobacco Use   • Smoking status: Never Smoker   • Smokeless tobacco: Current User     Types: Chew   Vaping Use   • Vaping Use: Never used   Substance and Sexual Activity   • Alcohol use: Not Currently     Alcohol/week: 0.0 standard drinks   • Drug use: Never   • Sexual activity: Yes     Partners: Female, Male     Birth control/protection: None      History reviewed. No pertinent past medical history.  Past Surgical History:   Procedure Laterality Date   • APPENDECTOMY         Family History   Problem Relation Age of Onset   • Diabetes Mother                Objective:  Physical Exam  General: No acute distress.  Eyes: conjunctiva clear; pupils equally round and reactive  ENT: external ears and nose atraumatic; oropharynx clear  CV: no peripheral edema  Resp: normal respiratory effort  Skin: no rashes or wounds; normal turgor  Psych: mood and affect appropriate; recent and remote memory intact    There were no vitals filed for this visit.  There were no vitals filed for this visit.  There is no height or weight on file to calculate BMI.      Ortho  Exam     Right hand examined  Positive tenderness along the palmar flexion crease, minimal erythema, mild swelling all compartments soft easily compressible  Brisk cap refill all digits right hand  Positive sensation the dorsum and palmar aspect of the hand and all digits  2+ distal radial pulse  Positive tenderness along the palmar aspect of the right hand    Assessment:        1. Trigger middle finger of right hand           Plan:  1. Discussed plan of care with patient.  We will start doxycycline 1 tablet with food twice daily.  Plan to see him back in clinic on 5/18/2022 to reevaluate.  Discussed to continue with ice avoid application of heat.  Encouraged to call to clinic with any worsening of symptoms questions or concerns.  Recommend meloxicam for pain.  Prescription will be sent to pharmacy along with the doxycycline.  If symptoms progressively get worse over the weekend he was encouraged to present to ER.  All questions answered.  Orders:  No orders of the defined types were placed in this encounter.    New Medications Ordered This Visit   Medications   • doxycycline (VIBRAMYCIN) 100 MG capsule     Sig: Take 1 capsule by mouth 2 (Two) Times a Day. Pharmacist to change salts per insurance preference     Dispense:  20 capsule     Refill:  0   • meloxicam (MOBIC) 15 MG tablet     Sig: Take 1 tablet by mouth Daily.     Dispense:  30 tablet     Refill:  0           I ordered and reviewed the VALERY today.   Dragon Dictation utilized.

## 2022-05-20 ENCOUNTER — OFFICE VISIT (OUTPATIENT)
Dept: ORTHOPEDIC SURGERY | Facility: CLINIC | Age: 42
End: 2022-05-20

## 2022-05-20 VITALS — WEIGHT: 230 LBS | BODY MASS INDEX: 34.86 KG/M2 | HEIGHT: 68 IN

## 2022-05-20 DIAGNOSIS — M65.331 TRIGGER MIDDLE FINGER OF RIGHT HAND: ICD-10-CM

## 2022-05-20 DIAGNOSIS — S63.219D SUBLUXATION OF METACARPOPHALANGEAL JOINT OF FINGER, SUBSEQUENT ENCOUNTER: Primary | ICD-10-CM

## 2022-05-20 PROCEDURE — 99213 OFFICE O/P EST LOW 20 MIN: CPT | Performed by: NURSE PRACTITIONER

## 2022-05-20 NOTE — PROGRESS NOTES
"Subjective:     Patient ID: Nicholas Whitehead is a 41 y.o. male.    Chief Complaint:  Follow-up right hand  History of Present Illness  Nicholas Whitehead returns to clinic for follow-up of his right hand.  Is continued to improve is able to make a fist as of this week.  Is continued anti-inflammatory medications as well as doxycycline.  He is noticing now a catching sensation at the MCP joint third digit right hand with gripping.  Denies any presence of numbness or tingling again has continued with finger range of motion has noted improvement of symptoms as far as the pain but is experiencing the popping.  Denies that the finger and digit is locking or catching.  Denies other concerns present this time.     Social History     Occupational History   • Not on file   Tobacco Use   • Smoking status: Never Smoker   • Smokeless tobacco: Current User     Types: Chew   Vaping Use   • Vaping Use: Never used   Substance and Sexual Activity   • Alcohol use: Not Currently     Alcohol/week: 0.0 standard drinks   • Drug use: Never   • Sexual activity: Yes     Partners: Female, Male     Birth control/protection: None      History reviewed. No pertinent past medical history.  Past Surgical History:   Procedure Laterality Date   • APPENDECTOMY         Family History   Problem Relation Age of Onset   • Diabetes Mother                Objective:  Physical Exam    Vital signs reviewed.   General: No acute distress.  Eyes: conjunctiva clear; pupils equally round and reactive  ENT: external ears and nose atraumatic; oropharynx clear  CV: no peripheral edema  Resp: normal respiratory effort  Skin: no rashes or wounds; normal turgor  Psych: mood and affect appropriate; recent and remote memory intact    Vitals:    05/20/22 0945   Weight: 104 kg (230 lb)   Height: 172.7 cm (68\")         05/20/22  0945   Weight: 104 kg (230 lb)     Body mass index is 34.97 kg/m².      Right Hand Exam     Tenderness   The patient is experiencing tenderness in the palmar " area.    Range of Motion   Wrist   Extension: 45   Flexion: 80     Muscle Strength   : 5/5     Other   Erythema: absent  Sensation: normal  Pulse: present    Comments:  Popping along MCP joint               Assessment:        1. Subluxation of metacarpophalangeal joint of finger, subsequent encounter    2. Trigger middle finger of right hand           Plan:  1. Discussed plan of care with patient. Will proceed with MRI right hand. Plan to see him back in clinic after completion of testing discussed results and further plan of care.  May consider referral.  Arm and hand in the future.  He verbalized understanding of all information agrees with plan of care.  Denies other concerns present time.  Orders:  Orders Placed This Encounter   Procedures   • MRI hand right wo contrast     No orders of the defined types were placed in this encounter.          Dragon dictation utilized.

## 2022-06-08 ENCOUNTER — TELEMEDICINE (OUTPATIENT)
Dept: FAMILY MEDICINE CLINIC | Facility: TELEHEALTH | Age: 42
End: 2022-06-08

## 2022-06-08 VITALS — HEIGHT: 68 IN | BODY MASS INDEX: 34.86 KG/M2 | TEMPERATURE: 97.6 F | WEIGHT: 230 LBS

## 2022-06-08 DIAGNOSIS — L25.9 CONTACT DERMATITIS, UNSPECIFIED CONTACT DERMATITIS TYPE, UNSPECIFIED TRIGGER: Primary | ICD-10-CM

## 2022-06-08 PROCEDURE — 99213 OFFICE O/P EST LOW 20 MIN: CPT | Performed by: NURSE PRACTITIONER

## 2022-06-08 RX ORDER — CETIRIZINE HYDROCHLORIDE 10 MG/1
10 TABLET ORAL DAILY
Qty: 30 TABLET | Refills: 0 | Status: SHIPPED | OUTPATIENT
Start: 2022-06-08 | End: 2022-07-08

## 2022-06-08 RX ORDER — PREDNISONE 20 MG/1
TABLET ORAL
Qty: 13 TABLET | Refills: 0 | Status: SHIPPED | OUTPATIENT
Start: 2022-06-08

## 2022-06-08 NOTE — PROGRESS NOTES
"You have chosen to receive care through a telehealth visit.  Do you consent to use a video/audio connection for your medical care today? Yes     CHIEF COMPLAINT  Cc: rash     HPI  Nicholas Whitehead is a 41 y.o. male  presents with complaint of a rash. The rash is on his bilateral lower extremities at his boot line. He has uploaded images of the rash. He is not aware of any new exposures. He did go up in an attic yesterday vut ha been up there in the past. The rash is red and itching. He has not tried taking any antihistamines. He has tried triamcinolone cream that his wife had at home. The rash is not any where else on the patient except at the top of his boot line.He did not wear the boots to work today but wore tennis shoes instead. T    Review of Systems   Constitutional: Negative for fever.   HENT: Negative for congestion and sore throat.    Respiratory: Negative for cough.    Cardiovascular: Negative for chest pain.   Gastrointestinal: Negative for diarrhea and nausea.   Musculoskeletal: Negative for myalgias.   Skin: Positive for rash (rash bilateral legs where boots hit, red, itchy).   Neurological: Negative for headaches.       History reviewed. No pertinent past medical history.    Family History   Problem Relation Age of Onset   • Diabetes Mother        Social History     Socioeconomic History   • Marital status:    Tobacco Use   • Smoking status: Never Smoker   • Smokeless tobacco: Current User     Types: Chew   Vaping Use   • Vaping Use: Never used   Substance and Sexual Activity   • Alcohol use: Not Currently     Alcohol/week: 0.0 standard drinks   • Drug use: Never   • Sexual activity: Yes     Partners: Female, Male     Birth control/protection: None       Nicholas Whitehead  reports that he has never smoked. His smokeless tobacco use includes chew..        Temp 97.6 °F (36.4 °C)   Ht 172.7 cm (68\")   Wt 104 kg (230 lb)   BMI 34.97 kg/m²     PHYSICAL EXAM  Physical Exam   Constitutional: He is oriented " to person, place, and time. He appears well-developed and well-nourished.   HENT:   Head: Normocephalic and atraumatic.   Right Ear: External ear normal.   Left Ear: External ear normal.   Nose: Nose normal.   Mouth/Throat: Mouth/Lips are normal.  Eyes: Lids are normal. Right eye exhibits no discharge and no exudate. Left eye exhibits no discharge and no exudate. Right conjunctiva is not injected. Left conjunctiva is not injected.   Pulmonary/Chest: No accessory muscle usage. No tachypnea and no bradypnea.  No respiratory distress.No use of oxygen by nasal cannulaNo use of oxygen by mask noted.  Abdominal: Abdomen appears normal.   Neurological: He is alert and oriented to person, place, and time. No cranial nerve deficit.   Skin: Rash (bilateral lower extemities about 4cm in width, erythematous, with abrsions most likely from scratching noted) noted.   Psychiatric: He has a normal mood and affect. His speech is normal and behavior is normal. Judgment and thought content normal.       Results for orders placed or performed during the hospital encounter of 03/16/21   Comprehensive Metabolic Panel    Specimen: Blood   Result Value Ref Range    Glucose 214 (H) 65 - 99 mg/dL    BUN 20 6 - 20 mg/dL    Creatinine 1.24 0.76 - 1.27 mg/dL    Sodium 137 136 - 145 mmol/L    Potassium 3.9 3.5 - 5.2 mmol/L    Chloride 100 98 - 107 mmol/L    CO2 23.3 22.0 - 29.0 mmol/L    Calcium 9.4 8.6 - 10.5 mg/dL    Total Protein 7.3 6.0 - 8.5 g/dL    Albumin 4.40 3.50 - 5.20 g/dL    ALT (SGPT) 18 1 - 41 U/L    AST (SGOT) 11 1 - 40 U/L    Alkaline Phosphatase 104 39 - 117 U/L    Total Bilirubin 0.6 0.0 - 1.2 mg/dL    eGFR Non African Amer 64 >60 mL/min/1.73    Globulin 2.9 gm/dL    A/G Ratio 1.5 g/dL    BUN/Creatinine Ratio 16.1 7.0 - 25.0    Anion Gap 13.7 5.0 - 15.0 mmol/L   D-dimer, Quantitative    Specimen: Blood   Result Value Ref Range    D-Dimer, Quantitative 0.50 (H) 0.00 - 0.46 MCGFEU/mL   Troponin    Specimen: Blood   Result Value  Ref Range    Troponin T <0.010 0.000 - 0.030 ng/mL   CBC Auto Differential    Specimen: Blood   Result Value Ref Range    WBC 18.11 (H) 3.40 - 10.80 10*3/mm3    RBC 5.31 4.14 - 5.80 10*6/mm3    Hemoglobin 15.7 13.0 - 17.7 g/dL    Hematocrit 47.7 37.5 - 51.0 %    MCV 89.8 79.0 - 97.0 fL    MCH 29.6 26.6 - 33.0 pg    MCHC 32.9 31.5 - 35.7 g/dL    RDW 13.4 12.3 - 15.4 %    RDW-SD 44.9 37.0 - 54.0 fl    MPV 10.3 6.0 - 12.0 fL    Platelets 326 140 - 450 10*3/mm3    Neutrophil % 79.8 (H) 42.7 - 76.0 %    Lymphocyte % 12.0 (L) 19.6 - 45.3 %    Monocyte % 5.9 5.0 - 12.0 %    Eosinophil % 1.7 0.3 - 6.2 %    Basophil % 0.4 0.0 - 1.5 %    Immature Grans % 0.2 0.0 - 0.5 %    Neutrophils, Absolute 14.46 (H) 1.70 - 7.00 10*3/mm3    Lymphocytes, Absolute 2.17 0.70 - 3.10 10*3/mm3    Monocytes, Absolute 1.07 (H) 0.10 - 0.90 10*3/mm3    Eosinophils, Absolute 0.30 0.00 - 0.40 10*3/mm3    Basophils, Absolute 0.07 0.00 - 0.20 10*3/mm3    Immature Grans, Absolute 0.04 0.00 - 0.05 10*3/mm3   Urine Drug Screen -    Specimen: Urine   Result Value Ref Range    THC, Screen, Urine Negative Negative    Phencyclidine (PCP), Urine Negative Negative    Cocaine Screen, Urine Negative Negative    Methamphetamine, Ur Positive (A) Negative    Opiate Screen Negative Negative    Amphetamine Screen, Urine Positive (A) Negative    Benzodiazepine Screen, Urine Negative Negative    Tricyclic Antidepressants Screen Negative Negative    Methadone Screen, Urine Negative Negative    Barbiturates Screen, Urine Negative Negative    Oxycodone Screen, Urine Negative Negative    Propoxyphene Screen Negative Negative    Buprenorphine, Screen, Urine Negative Negative   Troponin    Specimen: Blood   Result Value Ref Range    Troponin T <0.010 0.000 - 0.030 ng/mL   ECG 12 Lead   Result Value Ref Range    QT Interval 325 ms   ECG 12 Lead   Result Value Ref Range    QT Interval 352 ms       Diagnoses and all orders for this visit:    1. Contact dermatitis, unspecified  contact dermatitis type, unspecified trigger (Primary)    Other orders  -     predniSONE (DELTASONE) 20 MG tablet; Prednisone 20mg tabs, 3 for 2 days, 2 for 2 days, 1 for 2 days, 1/2 for 2 days take with food or milk  Dispense: 13 tablet; Refill: 0  -     cetirizine (zyrTEC) 10 MG tablet; Take 1 tablet by mouth Daily for 30 days.  Dispense: 30 tablet; Refill: 0    Take prednisone with food as early in the day as possible  Do not take prednisone with nsaids such as ibuprofen, aleve, or aspirin  May take tylenol for pain or fever  Take cetirizine daily in am  Take benadryl 25mg at hs  May use triamcinolone cream that you have on hand twice daily  Wear tennis shoes instead of boots until rash clears up    FOLLOW-UP  If symptoms worsen or persist follow up with PCP.Virtual Care, Urgent Care or dermatologist    Patient verbalizes understanding of medication dosage, comfort measures, instructions for treatment and follow-up.    MATT Cleveland  06/08/2022  17:12 EDT    The use of a video visit has been reviewed with the patient and verbal informed consent has been obtained. Myself and Nicholas Whitehead participated in this visit. The patient is located in [facility] old [address] [address].    I am located in [place], [place]. Mychart and Zoom were utilized. I spent 20 minutes in the patient's chart for this visit.

## 2022-06-09 ENCOUNTER — HOSPITAL ENCOUNTER (OUTPATIENT)
Dept: MRI IMAGING | Facility: HOSPITAL | Age: 42
Discharge: HOME | End: 2022-06-09
Admitting: NURSE PRACTITIONER

## 2022-06-09 DIAGNOSIS — S63.219D SUBLUXATION OF METACARPOPHALANGEAL JOINT OF FINGER, SUBSEQUENT ENCOUNTER: ICD-10-CM

## 2022-06-09 PROCEDURE — 73218 MRI UPPER EXTREMITY W/O DYE: CPT

## 2022-06-09 RX ORDER — MELOXICAM 15 MG/1
15 TABLET ORAL DAILY
Qty: 30 TABLET | Refills: 0 | Status: SHIPPED | OUTPATIENT
Start: 2022-06-09 | End: 2022-07-12

## 2022-06-09 NOTE — TELEPHONE ENCOUNTER
Rx Refill Note  Requested Prescriptions     Pending Prescriptions Disp Refills    meloxicam (MOBIC) 15 MG tablet [Pharmacy Med Name: MELOXICAM 15MG TABLETS] 30 tablet 0     Sig: Take 1 tablet by mouth Daily.      Last office visit with prescribing clinician: 5/20/2022      Next office visit with prescribing clinician: Visit date not found   Last Filled:05.13.22    Subluxation of metacarpophalangeal joint of finger, subsequent encounter   Date of Surgery:      Beatrice Charlton MA  06/09/22, 09:05 EDT    {TIP  Encounters:    {TIP  Please add Last Relevant Lab Date if appropriate:  {TIP  Is Refill Pharmacy correct?:

## 2022-06-14 ENCOUNTER — TELEPHONE (OUTPATIENT)
Dept: ORTHOPEDIC SURGERY | Facility: CLINIC | Age: 42
End: 2022-06-14

## 2022-06-14 NOTE — TELEPHONE ENCOUNTER
MRI Hand Right Without Contrast    Result Date: 6/10/2022  MRI Hand RT WO INDICATION:  Middle finger pain and swelling x1 year. Worse over last 2 months. Evaluate MCP and PIP joint. TECHNIQUE: MRI of the right hand attention third digit without IV contrast. COMPARISON:  None available. FINDINGS: Normal osseous alignment. No focal marrow edema. Susceptibility artifact overlying the distal phalanx of the fourth finger may be related to metallic foreign body producing artifact. Joint spaces appear maintained. No joint effusion. The flexor digitorum superficialis and profundus tendons appear normal. The cruciate and collateral ligaments appear normal. The extensor mechanism appears normal. The sagittal bands appear normal. The volar plate appears normal. Intrinsic hand musculature unremarkable.     Metallic susceptibility artifact overlying the distal phalanx of fourth digit may be related to retained metallic foreign body which can even be microscopic. Otherwise normal MRI of the hand with attention to the third digit. Signer Name: KEILA Guillen MD  Signed: 6/10/2022 3:17 PM  Workstation Name: [facility]  [facility]    Reviewed MRI results right hand. Patient will be notified of results.  normal MRI of the hand with attention to the third digit.

## 2022-07-12 RX ORDER — MELOXICAM 15 MG/1
15 TABLET ORAL DAILY
Qty: 30 TABLET | Refills: 0 | Status: SHIPPED | OUTPATIENT
Start: 2022-07-12

## 2022-07-12 NOTE — TELEPHONE ENCOUNTER
Rx Refill Note  Requested Prescriptions     Pending Prescriptions Disp Refills    meloxicam (MOBIC) 15 MG tablet [Pharmacy Med Name: MELOXICAM 15MG TABLETS] 30 tablet 0     Sig: TAKE 1 TABLET BY MOUTH DAILY      Last office visit with prescribing clinician: 5/20/2022      Next office visit with prescribing clinician: Visit date not found   Last Filled:06.09.2022    DX:  Subluxation of metacarpophalangeal joint of finger, subsequent encounter     2. Trigger middle finger of right hand        Cassandra Vega MA  07/12/22, 08:38 EDT    {TIP  Encounters:    {TIP  Please add Last Relevant Lab Date if appropriate:  {TIP  Is Refill Pharmacy correct?:

## 2023-07-24 ENCOUNTER — OFFICE VISIT (OUTPATIENT)
Dept: ORTHOPEDIC SURGERY | Facility: CLINIC | Age: 43
End: 2023-07-24
Payer: COMMERCIAL

## 2023-07-24 VITALS — BODY MASS INDEX: 38.01 KG/M2 | HEIGHT: 68 IN

## 2023-07-24 DIAGNOSIS — S63.219D SUBLUXATION OF METACARPOPHALANGEAL JOINT OF FINGER, SUBSEQUENT ENCOUNTER: ICD-10-CM

## 2023-07-24 DIAGNOSIS — R20.2 PARESTHESIA OF HAND, BILATERAL: ICD-10-CM

## 2023-07-24 DIAGNOSIS — M77.11 LATERAL EPICONDYLITIS OF BOTH ELBOWS: ICD-10-CM

## 2023-07-24 DIAGNOSIS — M77.12 LATERAL EPICONDYLITIS OF BOTH ELBOWS: ICD-10-CM

## 2023-07-24 DIAGNOSIS — M65.331 TRIGGER MIDDLE FINGER OF RIGHT HAND: Primary | ICD-10-CM

## 2023-07-24 DIAGNOSIS — M25.522 LEFT ELBOW PAIN: ICD-10-CM

## 2023-07-24 DIAGNOSIS — M77.11 LATERAL EPICONDYLITIS OF RIGHT ELBOW: ICD-10-CM

## 2023-07-24 RX ORDER — TRIAMCINOLONE ACETONIDE 40 MG/ML
40 INJECTION, SUSPENSION INTRA-ARTICULAR; INTRAMUSCULAR
Status: COMPLETED | OUTPATIENT
Start: 2023-07-24 | End: 2023-07-24

## 2023-07-24 RX ORDER — LIDOCAINE HYDROCHLORIDE 10 MG/ML
2 INJECTION, SOLUTION EPIDURAL; INFILTRATION; INTRACAUDAL; PERINEURAL
Status: COMPLETED | OUTPATIENT
Start: 2023-07-24 | End: 2023-07-24

## 2023-07-24 RX ORDER — DICLOFENAC SODIUM 75 MG/1
75 TABLET, DELAYED RELEASE ORAL 2 TIMES DAILY
Qty: 90 TABLET | Refills: 0 | Status: SHIPPED | OUTPATIENT
Start: 2023-07-24

## 2023-07-24 RX ADMIN — LIDOCAINE HYDROCHLORIDE 2 ML: 10 INJECTION, SOLUTION EPIDURAL; INFILTRATION; INTRACAUDAL; PERINEURAL at 10:14

## 2023-07-24 RX ADMIN — TRIAMCINOLONE ACETONIDE 40 MG: 40 INJECTION, SUSPENSION INTRA-ARTICULAR; INTRAMUSCULAR at 10:14

## 2023-07-24 NOTE — PROGRESS NOTES
Subjective:     Patient ID: Nicholas Whitehead is a 43 y.o. male.    Chief Complaint:  Bilateral elbow pain, left elbow pain new issue to examiner  Paresthesia bilateral wrists  History of Present Illness  Nicholas Whitehead returns to clinic for follow-up bilateral hands bilateral wrists.  He continues to experience pain along the palmar and dorsum of the hand with associated paresthesia bilaterally he is experiencing pain while working and symptoms including paresthesia are waking him up at night.  Denies any prior corticosteroid injections we did inject the hand previously without any significant symptom resolution followed by oral prednisone.  He was last seen in office May 2022.  Over the last 3 weeks has noted pain along the lateral epicondyle of the left elbow, increased pain noted with extension flexion and gripping of the left upper extremity.  Rates discomfort a 9-10 out of a 10 stabbing in nature.  Positive for stiffness.  Pain with working driving.  Currently taking Aleve over-the-counter.  He is having difficulty with twisting tops lifting pulling and pushing with the upper extremities.He has had prior MRI and x-ray imaging of the right elbow denies any prior x-ray imaging, MRI or CT left elbow.  Denies any other concerns present.     Social History     Occupational History    Not on file   Tobacco Use    Smoking status: Never    Smokeless tobacco: Current     Types: Chew   Vaping Use    Vaping Use: Never used   Substance and Sexual Activity    Alcohol use: Not Currently    Drug use: Never    Sexual activity: Yes     Partners: Female     Birth control/protection: None      Past Medical History:   Diagnosis Date    Lateral epicondylitis of both elbows 7/24/2023     Past Surgical History:   Procedure Laterality Date    APPENDECTOMY         Family History   Problem Relation Age of Onset    Diabetes Mother                Objective:  Physical Exam    General: No acute distress.  Eyes: conjunctiva clear; pupils equally  "round and reactive  ENT: external ears and nose atraumatic; oropharynx clear  CV: no peripheral edema  Resp: normal respiratory effort  Skin: no rashes or wounds; normal turgor  Psych: mood and affect appropriate; recent and remote memory intact    Vitals:    07/24/23 0943   Height: 172.7 cm (68\")     There were no vitals filed for this visit.  Body mass index is 38.01 kg/m².      Right Hand Exam     Tenderness   The patient is experiencing tenderness in the palmar area.    Range of Motion   Wrist   Extension:  45   Flexion:  80   Pronation:  80   Supination:  80     Muscle Strength   Wrist extension: 5/5   Wrist flexion: 5/5   : 5/5     Tests   Tinel's sign (median nerve): positive    Other   Erythema: absent  Sensation: normal  Pulse: present    Comments:  Positive durkan's exam with numbness and tingling along the palmar of the thumb, index, long finger and radial aspect of the ring finger      Left Hand Exam     Tenderness   The patient is experiencing tenderness in the palmar area.     Range of Motion   Wrist   Extension:  45   Flexion:  80   Pronation:  80   Supination:  80     Muscle Strength   Wrist extension: 5/5   Wrist flexion: 5/5   :  5/5     Tests   Tinel's sign (median nerve): positive    Other   Erythema: absent  Sensation: normal  Pulse: present    Comments:  Positive durkan's exam with numbness and tingling along the palmar of the thumb, index, long finger and radial aspect of the ring finger      Right Elbow Exam     Tenderness   The patient is experiencing tenderness in the lateral epicondyle.     Range of Motion   Extension:  0   Flexion:  120   Pronation:  0   Supination:  120     Tests   Varus: negative  Valgus: negative  Tinel's sign (cubital tunnel): negative    Other   Erythema: absent  Sensation: normal  Pulse: present      Left Elbow Exam     Tenderness   The patient is experiencing tenderness in the lateral epicondyle.     Range of Motion   Extension:  0   Flexion:  120 "   Pronation:  0   Supination:  120     Tests   Varus: negative  Valgus: negative  Tinel's sign (cubital tunnel): negative    Other   Erythema: absent  Sensation: normal  Pulse: present               Imaging:  Left Elbow X-Ray  Indication: Pain  Views: AP and Lateral views    Findings:  No fracture  No bony lesion  Normal soft tissues  Normal joint spaces    No prior studies were available for comparison.  Right Hand X-Ray  Indication: Pain  AP, Lateral, and Oblique views    Findings:  No fracture  No bony lesion  Normal soft tissues  Normal joint spaces    prior studies were available for comparison.    Assessment:        1. Trigger middle finger of right hand    2. Left elbow pain    3. Subluxation of metacarpophalangeal joint of finger, subsequent encounter    4. Paresthesia of hand, bilateral    5. Lateral epicondylitis of both elbows           Plan:    - Medium Joint Arthrocentesis: bilateral elbow (Lateral epicondyle  ) on 7/24/2023 10:14 AM  Indications: pain  Details: 22 G needle, lateral approach  Medications (Right): 40 mg triamcinolone acetonide 40 MG/ML; 2 mL lidocaine PF 1% 1 %  Medications (Left): 2 mL lidocaine PF 1% 1 %; 40 mg triamcinolone acetonide 40 MG/ML  Outcome: tolerated well, no immediate complications  Procedure, treatment alternatives, risks and benefits explained, specific risks discussed. Consent was given by the patient. Immediately prior to procedure a time out was called to verify the correct patient, procedure, equipment, support staff and site/side marked as required. Patient was prepped and draped in the usual sterile fashion.         Discussed plan of care with patient.  Discussed treatment options at the elbows including corticosteroid injection lateral epicondyle bilaterally which she does wish to proceed with today's visit.  Do recommend tennis elbow straps bilateral upper extremities with lifting pulling pushing with the upper extremities.  May discontinue at night.  We also  discussed paresthesia bilateral hands.  We will proceed with soft wrist immobilizers at night only bilaterally.  Discontinue Aleve we will start diclofenac 75 mg twice daily.  Has previously tried meloxicam which did not offer him any symptom relief.  I will see him back in clinic in 6 weeks to reevaluate bilateral elbows and bilateral wrists.  Encouraged to call with any questions or concerns he has between now and follow-up.  All questions answered.  Orders:  Orders Placed This Encounter   Procedures    - Medium Joint Arthrocentesis: bilateral elbow    XR Hand 3+ View Right    XR Elbow 3+ View Left     New Medications Ordered This Visit   Medications    diclofenac (VOLTAREN) 75 MG EC tablet     Sig: Take 1 tablet by mouth 2 (Two) Times a Day.     Dispense:  90 tablet     Refill:  0             Dragon dictation utilized

## 2023-10-11 ENCOUNTER — PATIENT ROUNDING (BHMG ONLY) (OUTPATIENT)
Dept: ORTHOPEDIC SURGERY | Facility: CLINIC | Age: 43
End: 2023-10-11
Payer: COMMERCIAL

## 2023-10-11 ENCOUNTER — OFFICE VISIT (OUTPATIENT)
Dept: ORTHOPEDIC SURGERY | Facility: CLINIC | Age: 43
End: 2023-10-11
Payer: COMMERCIAL

## 2023-10-11 VITALS — BODY MASS INDEX: 37.77 KG/M2 | HEIGHT: 69 IN | WEIGHT: 255 LBS

## 2023-10-11 DIAGNOSIS — M79.641 BILATERAL HAND PAIN: ICD-10-CM

## 2023-10-11 DIAGNOSIS — G56.03 CARPAL TUNNEL SYNDROME, BILATERAL UPPER LIMBS: ICD-10-CM

## 2023-10-11 DIAGNOSIS — R52 PAIN: Primary | ICD-10-CM

## 2023-10-11 DIAGNOSIS — M79.642 BILATERAL HAND PAIN: ICD-10-CM

## 2023-10-11 DIAGNOSIS — L30.1 DYSHYDROSIS: ICD-10-CM

## 2023-10-11 DIAGNOSIS — M65.331 TRIGGER MIDDLE FINGER OF RIGHT HAND: ICD-10-CM

## 2023-10-11 RX ORDER — CLOBETASOL PROPIONATE 0.5 MG/G
1 CREAM TOPICAL 2 TIMES DAILY
Qty: 60 G | Refills: 1 | Status: SHIPPED | OUTPATIENT
Start: 2023-10-11

## 2023-10-11 NOTE — PROGRESS NOTES
"Patient Name: Nicholas Whitehead   YOB: 1980  Referring Primary Care Physician: Provider, No Known  BMI: Body mass index is 37.66 kg/mý.    Chief Complaint:    Chief Complaint   Patient presents with    Left Hand - Pain    Right Hand - Pain        HPI: New patient to me that presents with his wife is referred from his primary care physician for bilateral hand pain/numbness.  Patient is retired  right-hand-dominant denies any history of injury or trauma has had hand numbness and pins-and-needles to his fingers and he has a trigger finger on the middle of the right hand that gets stuck.  Patient has worn night splinting and braces that really have not helped he denies any neck problems or history of fracture in the hands in the past.00 no numbness of his arms or radicular pain going down both arms.  He also tells me has been dealing with \"eczema and dry skin to the right palm of his hand that he has tried Aquaphor and over-the-counter creams that have not helped he has not been to a dermatologist.  Denies any open lesions anywhere else in his body    Nicholas Whitehead is a 43 y.o. male who presents today for evaluation of   Chief Complaint   Patient presents with    Left Hand - Pain    Right Hand - Pain       This problem   is new to this examiner.     Subjective   Medications:   Home Medications:  Current Outpatient Medications on File Prior to Visit   Medication Sig    diclofenac (VOLTAREN) 75 MG EC tablet Take 1 tablet by mouth 2 (Two) Times a Day.    cetirizine (zyrTEC) 10 MG tablet Take 1 tablet by mouth Daily for 30 days.     No current facility-administered medications on file prior to visit.     Current Medications:  Scheduled Meds:  Continuous Infusions:No current facility-administered medications for this visit.    PRN Meds:.    I have reviewed the patient's medical history in detail and updated the computerized patient record.  Review and summarization of old records includes:    Past " "Medical History:   Diagnosis Date    Lateral epicondylitis of both elbows 7/24/2023        Past Surgical History:   Procedure Laterality Date    APPENDECTOMY          Social History     Occupational History    Not on file   Tobacco Use    Smoking status: Never    Smokeless tobacco: Current     Types: Chew   Vaping Use    Vaping Use: Never used   Substance and Sexual Activity    Alcohol use: Not Currently    Drug use: Never    Sexual activity: Yes     Partners: Female     Birth control/protection: None      Social History     Social History Narrative    Not on file        Family History   Problem Relation Age of Onset    Diabetes Mother        ROS: 14 point review of systems was performed and all other systems were reviewed and are negative except for documented findings in HPI and today's encounter.     Allergies: No Known Allergies  Constitutional:  Denies fever, shaking or chills   Eyes:  Denies change in visual acuity   HENT:  Denies nasal congestion or sore throat   Respiratory:  Denies cough or shortness of breath   Cardiovascular:  Denies chest pain or severe LE edema   GI:  Denies abdominal pain, nausea, vomiting, bloody stools or diarrhea   Musculoskeletal:  Numbness, tingling, pain, or loss of motor function only as noted above in history of present illness.  : Denies painful urination or hematuria  Integument:  Denies rash, lesion or ulceration   Neurologic:  Denies headache or focal weakness  Endocrine:  Denies lymphadenopathy  Psych:  Denies confusion or change in mental status   Hem:  Denies active bleeding    OBJECTIVE:  Physical Exam:   Ht 175.3 cm (69\")   Wt 116 kg (255 lb)   BMI 37.66 kg/mý     General Appearance:    Alert, cooperative, in no acute distress                  Eyes: conjunctiva clear  ENT: external ears and nose atraumatic  CV: no peripheral edema  Resp: normal respiratory effort  Skin: no rashes or wounds; normal turgor  Psych: mood and affect appropriate  Lymph: no nodes " appreciated  Neuro: gross sensation intact  Vascular:  Palpable peripheral pulse in noted extremity  Musculoskeletal Extremities: Skin is warm dry intact with good pulses and incision to both hands no obvious Dupuytren's or contractions noted cap refills intact pulses are intact he can open and close hands freely without any discomfort Phalen's elicits pain to both hands carpal compression test of both hands elicits pain numbness and tingling within 30 seconds.  Right palmar aspect has dyshidrosis with some eczema very dry skin no signs of infection no erythema no drainage or open wounds    Radiology:   X-ray both hands 3 views done for pain with no comparison films show degenerative changes within the hand questionable old fracture at the base of the third fourth metacarpal on the right no acute fractures noted    Assessment:     ICD-10-CM ICD-9-CM   1. Pain  R52 780.96   2. Bilateral hand pain  M79.641 729.5    M79.642    3. Trigger middle finger of right hand  M65.331 727.03   4. Dyshydrosis  L30.1 705.81   5. Carpal tunnel syndrome, bilateral upper limbs  G56.03 354.0        Procedures  Lengthy discussion with him and his partner discussed that I can refer them to dermatology for the work-up on the eczema I can try some steroid cream to see if we get some comfort from the itching and peeling.  Referral to hand surgery for injections and evaluation of what presents as carpal tunnel to both hands and I do not do injections up within the wrist or hand.  Can work-up his neck if that is warranted in the future can continue to wear the night splints and lets get him in with hand surgery to further evaluate and treat thank you    Plan: The diagnosis(es), natural history, pathophysiology and treatment for diagnosis(es) were discussed. Opportunity given and questions answered.  Biomechanics of pertinent body areas discussed.  When appropriate, the use of ambulatory aids discussed.  EXERCISES:  Advice on benefits of, and  types of regular/moderate exercise pertaining to orthopedic diagnosis(es).  MEDICATIONS:  The risks, benefits, warnings,side effects and alternatives of medications discussed.  Inflammation/pain control; with cold, heat, elevation and/or liniments discussed as appropriate  SPECIALTY REFERRAL  MEDICAL RECORDS reviewed from other provider(s) for past and current medical history pertinent to this complaint.  Can work up neck     10/11/2023    Much of this encounter note is an electronic transcription/translation of spoken language to printed text. The electronic translation of spoken language may permit erroneous, or at times, nonsensical words or phrases to be inadvertently transcribed; Although I have reviewed the note for such errors, some may still exist

## 2023-10-11 NOTE — PROGRESS NOTES
A [employer] Message has been sent to the patient for PATIENT ROUNDING with [facility]

## 2023-11-13 RX ORDER — DICLOFENAC SODIUM 75 MG/1
75 TABLET, DELAYED RELEASE ORAL 2 TIMES DAILY
Qty: 90 TABLET | Refills: 0 | Status: SHIPPED | OUTPATIENT
Start: 2023-11-13

## 2023-12-21 ENCOUNTER — TELEMEDICINE (OUTPATIENT)
Dept: FAMILY MEDICINE CLINIC | Facility: TELEHEALTH | Age: 43
End: 2023-12-21
Payer: COMMERCIAL

## 2023-12-21 DIAGNOSIS — R07.9 CHEST PAIN, UNSPECIFIED TYPE: Primary | ICD-10-CM

## 2023-12-21 PROCEDURE — 1160F RVW MEDS BY RX/DR IN RCRD: CPT | Performed by: NURSE PRACTITIONER

## 2023-12-21 PROCEDURE — 99213 OFFICE O/P EST LOW 20 MIN: CPT | Performed by: NURSE PRACTITIONER

## 2023-12-21 PROCEDURE — 1159F MED LIST DOCD IN RCRD: CPT | Performed by: NURSE PRACTITIONER

## 2023-12-21 NOTE — PROGRESS NOTES
You have chosen to receive care through a telehealth visit.  Do you consent to use a video/audio connection for your medical care today? Yes     CHIEF COMPLAINT  Chief Complaint   Patient presents with    Chest Pain         HPI  Nicholas Whitehead is a 43 y.o. male  presents with complaint of chest pain that began at work yesterday for 1 hour. Reports he was breathing fast and heart pounding. Stopped then return for 1.5 hours. Reports he took an aspirin when this happened and it helped. Reports he has had alittle cough and sore throat.     Review of Systems   Constitutional:  Negative for chills, fatigue and fever.   HENT:  Positive for sore throat. Negative for congestion, ear discharge, ear pain, sinus pressure and sinus pain.    Respiratory:  Positive for cough. Negative for chest tightness, shortness of breath and wheezing.         Breathing hard yesterday   Cardiovascular:  Positive for chest pain.        Increased heart rate yesterday   Gastrointestinal:  Negative for abdominal pain, diarrhea, nausea and vomiting.   Musculoskeletal:  Negative for back pain and myalgias.   Neurological:  Negative for dizziness and headaches.   Psychiatric/Behavioral: Negative.         Past Medical History:   Diagnosis Date    Lateral epicondylitis of both elbows 7/24/2023       Family History   Problem Relation Age of Onset    Diabetes Mother        Social History     Socioeconomic History    Marital status:    Tobacco Use    Smoking status: Never    Smokeless tobacco: Current     Types: Chew   Vaping Use    Vaping Use: Never used   Substance and Sexual Activity    Alcohol use: Not Currently    Drug use: Never    Sexual activity: Yes     Partners: Female     Birth control/protection: None       Nicholas Whtiehead  reports that he has never smoked. His smokeless tobacco use includes chew.. I have educated him on the risk of diseases from using tobacco products such as cancer, COPD, and heart disease.       I spent  1  minutes  counseling the patient.              There were no vitals taken for this visit.    PHYSICAL EXAM  Physical Exam   Constitutional: He is oriented to person, place, and time. He appears well-developed and well-nourished. No distress.   HENT:   Head: Normocephalic and atraumatic.   Mouth/Throat: Mouth/Lips are normal.Oropharynx is clear and moist.   Eyes: Conjunctivae and lids are normal.   Pulmonary/Chest: Effort normal.  No respiratory distress.  Abdominal: There is no abdominal tenderness.   Neurological: He is alert and oriented to person, place, and time.   Psychiatric: He has a normal mood and affect. His speech is normal and behavior is normal.       Results for orders placed or performed during the hospital encounter of 03/16/21   Comprehensive Metabolic Panel    Specimen: Blood   Result Value Ref Range    Glucose 214 (H) 65 - 99 mg/dL    BUN 20 6 - 20 mg/dL    Creatinine 1.24 0.76 - 1.27 mg/dL    Sodium 137 136 - 145 mmol/L    Potassium 3.9 3.5 - 5.2 mmol/L    Chloride 100 98 - 107 mmol/L    CO2 23.3 22.0 - 29.0 mmol/L    Calcium 9.4 8.6 - 10.5 mg/dL    Total Protein 7.3 6.0 - 8.5 g/dL    Albumin 4.40 3.50 - 5.20 g/dL    ALT (SGPT) 18 1 - 41 U/L    AST (SGOT) 11 1 - 40 U/L    Alkaline Phosphatase 104 39 - 117 U/L    Total Bilirubin 0.6 0.0 - 1.2 mg/dL    eGFR Non African Amer 64 >60 mL/min/1.73    Globulin 2.9 gm/dL    A/G Ratio 1.5 g/dL    BUN/Creatinine Ratio 16.1 7.0 - 25.0    Anion Gap 13.7 5.0 - 15.0 mmol/L   D-dimer, Quantitative    Specimen: Blood   Result Value Ref Range    D-Dimer, Quantitative 0.50 (H) 0.00 - 0.46 MCGFEU/mL   Troponin    Specimen: Blood   Result Value Ref Range    Troponin T <0.010 0.000 - 0.030 ng/mL   CBC Auto Differential    Specimen: Blood   Result Value Ref Range    WBC 18.11 (H) 3.40 - 10.80 10*3/mm3    RBC 5.31 4.14 - 5.80 10*6/mm3    Hemoglobin 15.7 13.0 - 17.7 g/dL    Hematocrit 47.7 37.5 - 51.0 %    MCV 89.8 79.0 - 97.0 fL    MCH 29.6 26.6 - 33.0 pg    MCHC 32.9 31.5 - 35.7  g/dL    RDW 13.4 12.3 - 15.4 %    RDW-SD 44.9 37.0 - 54.0 fl    MPV 10.3 6.0 - 12.0 fL    Platelets 326 140 - 450 10*3/mm3    Neutrophil % 79.8 (H) 42.7 - 76.0 %    Lymphocyte % 12.0 (L) 19.6 - 45.3 %    Monocyte % 5.9 5.0 - 12.0 %    Eosinophil % 1.7 0.3 - 6.2 %    Basophil % 0.4 0.0 - 1.5 %    Immature Grans % 0.2 0.0 - 0.5 %    Neutrophils, Absolute 14.46 (H) 1.70 - 7.00 10*3/mm3    Lymphocytes, Absolute 2.17 0.70 - 3.10 10*3/mm3    Monocytes, Absolute 1.07 (H) 0.10 - 0.90 10*3/mm3    Eosinophils, Absolute 0.30 0.00 - 0.40 10*3/mm3    Basophils, Absolute 0.07 0.00 - 0.20 10*3/mm3    Immature Grans, Absolute 0.04 0.00 - 0.05 10*3/mm3   Urine Drug Screen -    Specimen: Urine   Result Value Ref Range    THC, Screen, Urine Negative Negative    Phencyclidine (PCP), Urine Negative Negative    Cocaine Screen, Urine Negative Negative    Methamphetamine, Ur Positive (A) Negative    Opiate Screen Negative Negative    Amphetamine Screen, Urine Positive (A) Negative    Benzodiazepine Screen, Urine Negative Negative    Tricyclic Antidepressants Screen Negative Negative    Methadone Screen, Urine Negative Negative    Barbiturates Screen, Urine Negative Negative    Oxycodone Screen, Urine Negative Negative    Propoxyphene Screen Negative Negative    Buprenorphine, Screen, Urine Negative Negative   Troponin    Specimen: Blood   Result Value Ref Range    Troponin T <0.010 0.000 - 0.030 ng/mL   ECG 12 Lead   Result Value Ref Range    QT Interval 325 ms   ECG 12 Lead   Result Value Ref Range    QT Interval 352 ms       Diagnoses and all orders for this visit:    1. Chest pain, unspecified type (Primary)    Advised patient to go to ER for in person evaluation. Wife with patient she will take him to ER. Will go to [facility]. Pt agrees to call EMS if symptoms worsen. Patient understands the risk and consequences of not going to the ER        Giuliana Vicente, APRN  12/21/2023  05:25 EST    The use of a video visit has been  reviewed with the patient and verbal informed consent has been obtained. Myself and Nicholas Whitehead participated in this visit. The patient is located in 55[address] [address].    I am located in [place], [place]. Mychart and Twilio were utilized. I spent 5 minutes in the patient's chart for this visit.

## 2023-12-21 NOTE — LETTER
December 21, 2023     Patient: Nicholas Whitehead   YOB: 1980   Date of Visit: 12/21/2023       To Whom It May Concern:    It is my medical opinion that Nicholas Whitehead be excused from work on 12/21/23.            Sincerely,    Giuliana GUTIERREZ     URGENT CARE VIDEO VISIT PROVIDER    CC: No Recipients

## 2024-02-06 ENCOUNTER — TELEPHONE (OUTPATIENT)
Dept: ORTHOPEDIC SURGERY | Facility: CLINIC | Age: 44
End: 2024-02-06
Payer: COMMERCIAL

## 2024-02-06 DIAGNOSIS — M79.641 BILATERAL HAND PAIN: Primary | ICD-10-CM

## 2024-02-06 DIAGNOSIS — M79.642 BILATERAL HAND PAIN: Primary | ICD-10-CM

## 2024-02-06 DIAGNOSIS — G56.03 CARPAL TUNNEL SYNDROME, BILATERAL UPPER LIMBS: ICD-10-CM

## 2024-02-06 DIAGNOSIS — M77.11 LATERAL EPICONDYLITIS OF BOTH ELBOWS: ICD-10-CM

## 2024-02-06 DIAGNOSIS — M77.12 LATERAL EPICONDYLITIS OF BOTH ELBOWS: ICD-10-CM

## 2024-02-06 NOTE — TELEPHONE ENCOUNTER
"----- Message from Naomi Dillard MA sent at 2/5/2024  4:16 PM EST -----  Regarding: FW: Referral  Contact: [phone]    ----- Message -----  From: Nicholas Whitehead \"Ace\"  Sent: 2/5/2024   4:16 PM EST  To: [facility]  Subject: Referral                                         Back in October the doctor referred me to a hand specialist. I would not able to make it due to not having a vehicle, and I Live about an hour away So now I have the vehicle and I tried to call the hand specialist today to make an appointment. They said that the doctor needs to send another referral. Do you think she can send a referral so I can get my hands looked out since they’re killing me I’d appreciate that. Thank you.    "

## 2024-03-20 ENCOUNTER — PATIENT ROUNDING (BHMG ONLY) (OUTPATIENT)
Dept: URGENT CARE | Facility: CLINIC | Age: 44
End: 2024-03-20
Payer: COMMERCIAL

## 2024-03-20 NOTE — ED NOTES
Thank you for letting us care for you in your recent visit to our urgent care center. We would love to hear about your experience with us. Was this the first time you have visited our location?    We’re always looking for ways to make our patients’ experiences even better. Do you have any recommendations on ways we may improve?     I appreciate you taking the time to respond. Please be on the lookout for a survey about your recent visit from [employer] via text or email. We would greatly appreciate if you could fill that out and turn it back in. We want your voice to be heard and we value your feedback.   Thank you for choosing [facility] for your healthcare needs.

## 2024-04-01 ENCOUNTER — OFFICE VISIT (OUTPATIENT)
Dept: FAMILY MEDICINE CLINIC | Facility: CLINIC | Age: 44
End: 2024-04-01
Payer: COMMERCIAL

## 2024-04-01 ENCOUNTER — PATIENT ROUNDING (BHMG ONLY) (OUTPATIENT)
Dept: FAMILY MEDICINE CLINIC | Facility: CLINIC | Age: 44
End: 2024-04-01
Payer: COMMERCIAL

## 2024-04-01 VITALS
SYSTOLIC BLOOD PRESSURE: 118 MMHG | RESPIRATION RATE: 20 BRPM | BODY MASS INDEX: 35.99 KG/M2 | OXYGEN SATURATION: 100 % | HEART RATE: 111 BPM | DIASTOLIC BLOOD PRESSURE: 68 MMHG | WEIGHT: 243 LBS | HEIGHT: 69 IN

## 2024-04-01 DIAGNOSIS — E55.9 VITAMIN D DEFICIENCY: ICD-10-CM

## 2024-04-01 DIAGNOSIS — R73.9 HYPERGLYCEMIA: ICD-10-CM

## 2024-04-01 DIAGNOSIS — R10.11 RIGHT UPPER QUADRANT PAIN: ICD-10-CM

## 2024-04-01 DIAGNOSIS — D72.829 LEUKOCYTOSIS, UNSPECIFIED TYPE: Primary | ICD-10-CM

## 2024-04-01 DIAGNOSIS — E78.5 HYPERLIPIDEMIA, UNSPECIFIED HYPERLIPIDEMIA TYPE: ICD-10-CM

## 2024-04-01 NOTE — PROGRESS NOTES
Chief Complaint  Establish Care (New patient here to establish care, has not been feeling right. Would like to get labs done and check prostate. ) and Neck Pain (Having a lot of neck and arm pain and headaches. Has appointment with [facility] August 1, 2024. )    Subjective        Nicholas Whitehead presents to [facility]  History of Present Illness    Nicholas Whitehead is a 43-year-old male who presents to the office to establish care. He is accompanied by his brother.    Pain in bilateral hands  Is originally from [place]. Reports persistent pain in his bilateral hands, back, neck, with a scheduled appointment scheduled for 08/2024. Has been diagnosed with tendinitis and experiences constant pain in his bilateral hands. Last summer, he developed a pinched nerve. The pain is localized beneath the bone in bilateral hands and is exacerbated by lifting objects. Also reports locking of his middle fingers, which are particularly painful in the morning. His occupation as a  and  involves rough use of his hands, a condition he has been dealing with since he was 15 years old. Recalls a past incident where he weighed 700 pounds of pressure in his right hand, and his left hand was not as strong as his right hand. Two years ago, he consulted with [facility], who administered something in a syringe: he does not know what it was, to alleviate the swelling in his knuckles and joints, which resulted in hand swelling that necessitated antibiotic treatment.    Labs and testing  The last labs were in 2021. Has not been previously diagnosed with elevated blood sugar levels by a professional. Occasionally experiences episodes of clamminess and weakness, which he manages by eating candy. Expresses a reluctance to undergo testing for diabetes due to a family history of diabetes and needles issues. Reports frequent urination, approximately 40 times a day, which he attributes to increased  "water intake.    Right-sided abdominal pain.  Reports experiencing soreness on the right side of his abdomen, reminiscent of pins, which was removed when he was 43 years old. The pain, while not sharp or severe, moves around, is constant and occurs throughout the day. Is unsure if the pain is related to food intake. Describes the pain as similar to hunger. Was scheduled for a colonoscopy in either 2017 or 2018 for abdominal pain, but was unable to due to his relocation. Experienced diarrhea 03/29/2024 but has not had diarrhea for an extended period. Denies constipation but reports gas. Avoids eating mushrooms, olives, and spicy foods. Experiences heartburn approximately twice a year.    Health maintenance  Just recovered from bronchitis about 3 weeks ago. Denies any issues with urinating. Admits to using cocaine for the first time in over a year, a habit he has never used throughout his life. Approximately 3 years ago, he attempted to use ecstasy with his wife, but is hesitant due to fear of needles. Denies smoking but admits to using coconut tobacco. He has tried the pouches pouches but they did not last long.    Objective   Vital Signs:  /68 (BP Location: Left arm, Patient Position: Sitting, Cuff Size: Large Adult)   Pulse 111   Resp 20   Ht 175.3 cm (69\")   Wt 110 kg (243 lb)   SpO2 100%   BMI 35.88 kg/m²   Estimated body mass index is 35.88 kg/m² as calculated from the following:    Height as of this encounter: 175.3 cm (69\").    Weight as of this encounter: 110 kg (243 lb).           Review of systems  Document verbalized content in HPI.      Physical Exam  Vitals reviewed.   Constitutional:       General: He is not in acute distress.     Appearance: He is not ill-appearing.   Cardiovascular:      Rate and Rhythm: Normal rate and regular rhythm.   Pulmonary:      Effort: Pulmonary effort is normal.      Breath sounds: Normal breath sounds.   Neurological:      Mental Status: He is alert. "   Psychiatric:         Mood and Affect: Mood normal.         Behavior: Behavior normal.         Thought Content: Thought content normal.         Judgment: Judgment normal.        Result Review :                     Assessment and Plan    Diagnosis Plan   1. Leukocytosis, unspecified type  CBC w AUTO Differential      2. Right upper quadrant pain  Comprehensive Metabolic Panel      3. Hyperglycemia  Hemoglobin A1c    MicroAlbumin, Urine, Random - Urine, Clean Catch      4. Hyperlipidemia, unspecified hyperlipidemia type  Lipid Panel      5. Vitamin D deficiency  Vitamin D 25 hydroxy         1. Health maintenance.  Laboratory tests will be conducted to assess his hemoglobin A1c, liver function, kidney function, cholesterol, and blood sugar levels.    2. Right-sided abdominal pain.  The pain could potentially be attributed to gas pain or a sensitivity to certain foods. The patient is advised to monitor his gas levels closely.    Follow-up:   The patient is scheduled for a follow-up visit in 1 month.         Follow Up     Return in about 1 month (around 5/1/2024) for Annual physical.  Patient was given instructions and counseling regarding his condition or for health maintenance advice. Please see specific information pulled into the AVS if appropriate.         Venipuncture Blood Specimen Collection  Venipuncture performed in left arm by Lv Richmond DO with good hemostasis. Patient tolerated the procedure well without complications.   04/14/24   DO Lv Ríos DO      Transcribed from ambient dictation for Lv Richmond DO by Jason Acosta.  04/01/24   16:43 EDT    Patient or patient representative verbalized consent to the visit recording.  I have personally performed the services described in this document as transcribed by the above individual, and it is both accurate and complete.

## 2024-04-01 NOTE — PROGRESS NOTES
A Aditive message has been sent to the patient for PATIENT ROUNDING with [facility]

## 2024-04-01 NOTE — PROGRESS NOTES
"    Lv Richmond DO  [facility]  [address]  [place] [address] [phone]    Subjective      Name Nicholas Whitehead MRN [id number]    1980 AGE/SEX 43 y.o. / male      Chief Complaint Chief Complaint   Patient presents with    Establish Care     New patient here to establish care, has not been feeling right. Would like to get labs done and check prostate.     Neck Pain     Having a lot of neck and arm pain and headaches. Has appointment with [facility] 2024.          Visit History for  2024    History of Present Illness  Nicholas Whitehead is a 43 y.o. male who presented today for Establish Care (New patient here to establish care, has not been feeling right. Would like to get labs done and check prostate. ) and Neck Pain (Having a lot of neck and arm pain and headaches. Has appointment with [facility] 2024. )  .        Medications and Allergies   Current Outpatient Medications   Medication Instructions    albuterol sulfate  (90 Base) MCG/ACT inhaler 1-2 puffs every 4-6 hours as needed     No Known Allergies   I have reviewed the above medications and allergies     Objective:      Vitals Vitals:    24 1504   BP: 118/68   BP Location: Left arm   Patient Position: Sitting   Cuff Size: Large Adult   Pulse: 111   Resp: 20   SpO2: 100%   Weight: 110 kg (243 lb)   Height: 175.3 cm (69\")     Body mass index is 35.88 kg/m².    Physical Exam       Assessment/Plan      Issues Addressed/ Plan  No diagnosis found.   There are no Patient Instructions on file for this visit.  {Class 2 Severe Obesity (BMI >=35 and <=39.9.:[id number]}         Follow up  recommended No follow-ups on file.   - Dragon voice recognition software was utilized to complete this chart.  Every reasonable attempt was made to edit and correct the text, however some incorrect words may remain.   Lv Richmond DO         "

## 2024-04-02 DIAGNOSIS — N19 RENAL FAILURE, UNSPECIFIED CHRONICITY: Primary | ICD-10-CM

## 2024-04-02 LAB
25(OH)D3+25(OH)D2 SERPL-MCNC: 16.5 NG/ML (ref 30–100)
ALBUMIN SERPL-MCNC: 4.9 G/DL (ref 3.5–5.2)
ALBUMIN/GLOB SERPL: 1.8 G/DL
ALP SERPL-CCNC: 139 U/L (ref 39–117)
ALT SERPL-CCNC: 22 U/L (ref 1–41)
AST SERPL-CCNC: 15 U/L (ref 1–40)
BASOPHILS # BLD AUTO: 0.07 10*3/MM3 (ref 0–0.2)
BASOPHILS NFR BLD AUTO: 0.5 % (ref 0–1.5)
BILIRUB SERPL-MCNC: 0.3 MG/DL (ref 0–1.2)
BUN SERPL-MCNC: 23 MG/DL (ref 6–20)
BUN/CREAT SERPL: 7.6 (ref 7–25)
CALCIUM SERPL-MCNC: 10.6 MG/DL (ref 8.6–10.5)
CHLORIDE SERPL-SCNC: 99 MMOL/L (ref 98–107)
CHOLEST SERPL-MCNC: 189 MG/DL (ref 0–200)
CO2 SERPL-SCNC: 22.1 MMOL/L (ref 22–29)
CREAT SERPL-MCNC: 3.02 MG/DL (ref 0.76–1.27)
EGFRCR SERPLBLD CKD-EPI 2021: 25.3 ML/MIN/1.73
EOSINOPHIL # BLD AUTO: 0.1 10*3/MM3 (ref 0–0.4)
EOSINOPHIL NFR BLD AUTO: 0.6 % (ref 0.3–6.2)
ERYTHROCYTE [DISTWIDTH] IN BLOOD BY AUTOMATED COUNT: 13.6 % (ref 12.3–15.4)
GLOBULIN SER CALC-MCNC: 2.7 GM/DL
GLUCOSE SERPL-MCNC: 70 MG/DL (ref 65–99)
HBA1C MFR BLD: 6.2 % (ref 4.8–5.6)
HCT VFR BLD AUTO: 48.7 % (ref 37.5–51)
HDLC SERPL-MCNC: 37 MG/DL (ref 40–60)
HGB BLD-MCNC: 16.3 G/DL (ref 13–17.7)
IMM GRANULOCYTES # BLD AUTO: 0.07 10*3/MM3 (ref 0–0.05)
IMM GRANULOCYTES NFR BLD AUTO: 0.5 % (ref 0–0.5)
LDLC SERPL CALC-MCNC: 114 MG/DL (ref 0–100)
LYMPHOCYTES # BLD AUTO: 3.42 10*3/MM3 (ref 0.7–3.1)
LYMPHOCYTES NFR BLD AUTO: 22.1 % (ref 19.6–45.3)
MCH RBC QN AUTO: 29.6 PG (ref 26.6–33)
MCHC RBC AUTO-ENTMCNC: 33.5 G/DL (ref 31.5–35.7)
MCV RBC AUTO: 88.4 FL (ref 79–97)
MICROALBUMIN UR-MCNC: 275.5 UG/ML
MONOCYTES # BLD AUTO: 1.64 10*3/MM3 (ref 0.1–0.9)
MONOCYTES NFR BLD AUTO: 10.6 % (ref 5–12)
NEUTROPHILS # BLD AUTO: 10.15 10*3/MM3 (ref 1.7–7)
NEUTROPHILS NFR BLD AUTO: 65.7 % (ref 42.7–76)
NRBC BLD AUTO-RTO: 0 /100 WBC (ref 0–0.2)
PLATELET # BLD AUTO: 348 10*3/MM3 (ref 140–450)
POTASSIUM SERPL-SCNC: 4.6 MMOL/L (ref 3.5–5.2)
PROT SERPL-MCNC: 7.6 G/DL (ref 6–8.5)
RBC # BLD AUTO: 5.51 10*6/MM3 (ref 4.14–5.8)
SODIUM SERPL-SCNC: 138 MMOL/L (ref 136–145)
TRIGL SERPL-MCNC: 216 MG/DL (ref 0–150)
VLDLC SERPL CALC-MCNC: 38 MG/DL (ref 5–40)
WBC # BLD AUTO: 15.45 10*3/MM3 (ref 3.4–10.8)

## 2024-04-04 ENCOUNTER — TELEPHONE (OUTPATIENT)
Dept: FAMILY MEDICINE CLINIC | Facility: CLINIC | Age: 44
End: 2024-04-04
Payer: COMMERCIAL

## 2024-04-04 NOTE — TELEPHONE ENCOUNTER
----- Message from Lv Richmond DO sent at 4/2/2024  8:06 AM EDT -----  Your hemoglobin A1c your 3-month average of your blood sugar is at 6.2% which is in the prediabetic range.  This means you really need to watch her blood sugar levels and consumption of carbohydrates with sugar.  Cholesterol is also elevated and we will continue to watch these levels and possible we may need to treat in the future.  Your white blood cell count remains elevated and possibly secondary to smoking as this has been elevated in the past for you as well.  Your vitamin D levels are really low and possibly require supplementation.  I will send a supplement for you to take for the next few months to help bring these levels back up to normal.    Most concerning result is your kidney function.  Your EGFR is severely decreased at 25.  You have had normal levels in the past.  I am not sure if this is secondary to your recent drug use or if this is a new onset of kidney failure.  Going to have you see a kidney specialist.  Please avoid any ibuprofen/NSAIDs, aspirin, and further illicit drug use.  Please make sure you are drinking a sufficient amount of water.  We are still waiting on 1 more lab, the urine test, that will help us further see kidney function.  I may also try to set you up for an ultrasound of your kidneys.  We should also repeat this lab in the next few weeks to make sure levels are stable

## 2024-04-12 ENCOUNTER — TELEPHONE (OUTPATIENT)
Dept: FAMILY MEDICINE CLINIC | Facility: CLINIC | Age: 44
End: 2024-04-12
Payer: COMMERCIAL

## 2024-04-12 ENCOUNTER — HOSPITAL ENCOUNTER (OUTPATIENT)
Dept: ULTRASOUND IMAGING | Facility: HOSPITAL | Age: 44
Discharge: HOME | End: 2024-04-12
Payer: COMMERCIAL

## 2024-04-12 DIAGNOSIS — N19 RENAL FAILURE, UNSPECIFIED CHRONICITY: ICD-10-CM

## 2024-04-12 PROCEDURE — 76775 US EXAM ABDO BACK WALL LIM: CPT

## 2024-04-12 NOTE — TELEPHONE ENCOUNTER
Patient has a tick bite on right side of upper abdomen, it is red and has a hard spot that feels hard. No rash or fever. Patient is concerned there may be infection present. Please advise

## 2024-04-30 ENCOUNTER — OFFICE VISIT (OUTPATIENT)
Dept: FAMILY MEDICINE CLINIC | Facility: CLINIC | Age: 44
End: 2024-04-30
Payer: COMMERCIAL

## 2024-04-30 VITALS
HEIGHT: 69 IN | HEART RATE: 76 BPM | BODY MASS INDEX: 36.88 KG/M2 | DIASTOLIC BLOOD PRESSURE: 98 MMHG | WEIGHT: 249 LBS | RESPIRATION RATE: 18 BRPM | SYSTOLIC BLOOD PRESSURE: 132 MMHG | OXYGEN SATURATION: 98 %

## 2024-04-30 DIAGNOSIS — G56.03 BILATERAL CARPAL TUNNEL SYNDROME: ICD-10-CM

## 2024-04-30 DIAGNOSIS — N19 RENAL FAILURE, UNSPECIFIED CHRONICITY: Primary | ICD-10-CM

## 2024-04-30 DIAGNOSIS — R73.03 PREDIABETES: ICD-10-CM

## 2024-04-30 PROCEDURE — 99214 OFFICE O/P EST MOD 30 MIN: CPT | Performed by: STUDENT IN AN ORGANIZED HEALTH CARE EDUCATION/TRAINING PROGRAM

## 2024-04-30 PROCEDURE — 1160F RVW MEDS BY RX/DR IN RCRD: CPT | Performed by: STUDENT IN AN ORGANIZED HEALTH CARE EDUCATION/TRAINING PROGRAM

## 2024-04-30 PROCEDURE — 1159F MED LIST DOCD IN RCRD: CPT | Performed by: STUDENT IN AN ORGANIZED HEALTH CARE EDUCATION/TRAINING PROGRAM

## 2024-04-30 NOTE — PROGRESS NOTES
Venipuncture Blood Specimen Collection  Venipuncture performed in left arm by Caterina Santos MA with good hemostasis. Patient tolerated the procedure well without complications.   04/30/24   Caterina Santos MA

## 2024-05-01 PROBLEM — R73.03 PREDIABETES: Status: ACTIVE | Noted: 2024-05-01

## 2024-05-01 LAB
BUN SERPL-MCNC: 19 MG/DL (ref 6–20)
BUN/CREAT SERPL: 16.1 (ref 7–25)
CALCIUM SERPL-MCNC: 9.7 MG/DL (ref 8.6–10.5)
CHLORIDE SERPL-SCNC: 103 MMOL/L (ref 98–107)
CO2 SERPL-SCNC: 24.5 MMOL/L (ref 22–29)
CREAT SERPL-MCNC: 1.18 MG/DL (ref 0.76–1.27)
EGFRCR SERPLBLD CKD-EPI 2021: 78 ML/MIN/1.73
GLUCOSE SERPL-MCNC: 87 MG/DL (ref 65–99)
POTASSIUM SERPL-SCNC: 4.8 MMOL/L (ref 3.5–5.2)
SODIUM SERPL-SCNC: 139 MMOL/L (ref 136–145)

## 2024-05-01 NOTE — PROGRESS NOTES
Lv Richmond,   [facility]  [address]  [place] [address] [phone]    Subjective      Name Nicholas Whitehead MRN [id number]    1980 AGE/SEX 43 y.o. / male      Chief Complaint Chief Complaint   Patient presents with    Follow-up     Follow up on labs    Numbness     Tingling and numbness in hands, having a hard time bending and closing. painful         Visit History for  2024    History of Present Illness  Nicholas Whitehead is a 43 y.o. male who presented today for Follow-up (Follow up on labs) and Numbness (Tingling and numbness in hands, having a hard time bending and closing. painful)  He is accompanied by an adult female.    The patient's  reports that his dietary habits have been suboptimal, with frequent consumption of sweets. He has increased his consumption of sweet tea, water, and Gatorade, which he consumes with lunch almost daily. His diet does not include pasta, but he enjoys potatoes and sandwiches for lunch.    The patient reports experiencing pain in his hands, back, and elbows, which intensifies upon returning from work and relaxation. Morning stiffness is particularly pronounced, preventing him from closing his hands and lifting his pants. He describes a sensation akin to someone standing on his hands. He also reports numbness and tingling, particularly in three of his fingers, which he attributes to extensive use of his hands. He rates his current pain as an 8 out of 10, down from 11 out of 10 during his last visit. He has been managing his pain with over-the-counter ibuprofen, taking four tablets in the morning for the past three months, which he finds beneficial. He has attempted to use braces, but experienced numbness from his wrist to his fingers upon waking. Despite performing wrist stretches, the pain persists. He has not utilized a massage gun. He underwent neck surgery in 2019.        Medications and Allergies   No current  "outpatient medications  No Known Allergies   I have reviewed the above medications and allergies     Objective:      Vitals Vitals:    04/30/24 0845   BP: 132/98   BP Location: Right arm   Patient Position: Sitting   Cuff Size: Large Adult   Pulse: 76   Resp: 18   SpO2: 98%   Weight: 113 kg (249 lb)   Height: 175.3 cm (69\")     Body mass index is 36.77 kg/m².    Physical Exam  Vitals reviewed.   Constitutional:       General: He is not in acute distress.     Appearance: He is not ill-appearing.   Pulmonary:      Effort: Pulmonary effort is normal.   Neurological:      Comments: Positive Claritza bilateral hands   Psychiatric:         Mood and Affect: Mood normal.         Behavior: Behavior normal.         Thought Content: Thought content normal.         Judgment: Judgment normal.       Physical Exam     Results  Laboratory Studies  EGFR is 25. A 3-month average of blood sugar is 6.2%.        Assessment/Plan      Issues Addressed/ Plan   Diagnosis Plan   1. Renal failure, unspecified chronicity  Basic Metabolic Panel      2. Prediabetes        3. Bilateral carpal tunnel syndrome           Assessment & Plan  1. Prediabetes.  The patient's blood glucose levels have been consistently elevated over the past three months, indicative of prediabetes. The patient was advised to limit his sugar intake and incorporate zero-sugar Gatorade into his daily routine.    2. Bilateral carpal tunnel syndrome.  The patient has been diagnosed with carpal tunnel syndrome. The patient was advised to utilize a nocturnal brace for a duration of 2 to 3 hours. He was also advised to perform stretching exercises 2 to 3 times daily. The use of a massage gun was also suggested.    3. Kidney function.  The patient's eGFR is recorded at 29, which is considered abnormal if levels fall below 50. An incidental finding of a benign cyst was found in the patient's kidney, but is most likely not contributory. The patient was advised to avoid certain " medications such as Advil or ibuprofen and to maintain adequate hydration. A referral to a nephrologist will be made for further evaluation.     Class 2 Severe Obesity (BMI >=35 and <=39.9). Obesity-related health conditions include the following:  Prediabetes . Obesity is unchanged. BMI is is above average; BMI management plan is completed. We discussed portion control and increasing exercise.       There are no Patient Instructions on file for this visit.        Follow up  recommended Return in about 1 month (around 5/30/2024).   - Dragon voice recognition software was utilized to complete this chart.  Every reasonable attempt was made to edit and correct the text, however some incorrect words may remain.   Lv Richmond DO    Patient or patient representative verbalized consent for the use of Ambient Listening during the visit with  Lv Richmond DO for chart documentation. 5/1/2024  18:04 EDT